# Patient Record
Sex: MALE | Race: WHITE | NOT HISPANIC OR LATINO
[De-identification: names, ages, dates, MRNs, and addresses within clinical notes are randomized per-mention and may not be internally consistent; named-entity substitution may affect disease eponyms.]

---

## 2017-01-11 ENCOUNTER — APPOINTMENT (OUTPATIENT)
Dept: UROLOGY | Facility: CLINIC | Age: 77
End: 2017-01-11

## 2017-01-11 VITALS — SYSTOLIC BLOOD PRESSURE: 140 MMHG | OXYGEN SATURATION: 95 % | DIASTOLIC BLOOD PRESSURE: 70 MMHG | HEART RATE: 75 BPM

## 2017-01-11 DIAGNOSIS — K59.09 OTHER CONSTIPATION: ICD-10-CM

## 2017-01-11 RX ORDER — DOCUSATE SODIUM 100 MG/1
100 CAPSULE ORAL TWICE DAILY
Qty: 180 | Refills: 2 | Status: ACTIVE | COMMUNITY
Start: 2017-01-11 | End: 1900-01-01

## 2017-01-12 LAB
APPEARANCE: CLEAR
BACTERIA: NEGATIVE
BILIRUBIN URINE: NEGATIVE
BLOOD URINE: NEGATIVE
COLOR: YELLOW
GLUCOSE QUALITATIVE U: NORMAL MG/DL
HYALINE CASTS: 1 /LPF
KETONES URINE: NEGATIVE
LEUKOCYTE ESTERASE URINE: NEGATIVE
MICROSCOPIC-UA: NORMAL
NITRITE URINE: NEGATIVE
PH URINE: 5.5
PROTEIN URINE: NEGATIVE MG/DL
RED BLOOD CELLS URINE: 1 /HPF
SPECIFIC GRAVITY URINE: 1.02
SQUAMOUS EPITHELIAL CELLS: 1 /HPF
UROBILINOGEN URINE: NORMAL MG/DL
WHITE BLOOD CELLS URINE: 1 /HPF

## 2017-01-16 LAB — BACTERIA UR CULT: NORMAL

## 2017-07-07 ENCOUNTER — APPOINTMENT (OUTPATIENT)
Dept: UROLOGY | Facility: CLINIC | Age: 77
End: 2017-07-07

## 2017-07-07 VITALS — OXYGEN SATURATION: 96 % | SYSTOLIC BLOOD PRESSURE: 120 MMHG | DIASTOLIC BLOOD PRESSURE: 80 MMHG | HEART RATE: 68 BPM

## 2018-01-05 ENCOUNTER — APPOINTMENT (OUTPATIENT)
Dept: UROLOGY | Facility: CLINIC | Age: 78
End: 2018-01-05

## 2018-09-13 ENCOUNTER — APPOINTMENT (OUTPATIENT)
Dept: UROLOGY | Facility: CLINIC | Age: 78
End: 2018-09-13
Payer: MEDICARE

## 2018-09-13 VITALS — HEART RATE: 66 BPM | SYSTOLIC BLOOD PRESSURE: 130 MMHG | OXYGEN SATURATION: 96 % | DIASTOLIC BLOOD PRESSURE: 80 MMHG

## 2018-09-13 DIAGNOSIS — N30.40 IRRADIATION CYSTITIS W/OUT HEMATURIA: ICD-10-CM

## 2018-09-13 PROCEDURE — 99213 OFFICE O/P EST LOW 20 MIN: CPT

## 2018-09-24 LAB
APPEARANCE: CLEAR
BACTERIA UR CULT: NORMAL
BACTERIA: NEGATIVE
BILIRUBIN URINE: NEGATIVE
BLOOD URINE: NEGATIVE
COLOR: YELLOW
GLUCOSE QUALITATIVE U: NEGATIVE MG/DL
HYALINE CASTS: 6 /LPF
KETONES URINE: NEGATIVE
LEUKOCYTE ESTERASE URINE: NEGATIVE
MICROSCOPIC-UA: NORMAL
NITRITE URINE: NEGATIVE
PH URINE: 5.5
PROTEIN URINE: NEGATIVE MG/DL
PSA SERPL-MCNC: 0.17 NG/ML
RED BLOOD CELLS URINE: 3 /HPF
SPECIFIC GRAVITY URINE: 1.02
SQUAMOUS EPITHELIAL CELLS: 2 /HPF
UROBILINOGEN URINE: NEGATIVE MG/DL
WHITE BLOOD CELLS URINE: 2 /HPF

## 2019-09-10 ENCOUNTER — TRANSCRIPTION ENCOUNTER (OUTPATIENT)
Age: 79
End: 2019-09-10

## 2019-09-12 ENCOUNTER — APPOINTMENT (OUTPATIENT)
Dept: UROLOGY | Facility: CLINIC | Age: 79
End: 2019-09-12
Payer: MEDICARE

## 2019-09-12 VITALS
BODY MASS INDEX: 41.78 KG/M2 | DIASTOLIC BLOOD PRESSURE: 80 MMHG | SYSTOLIC BLOOD PRESSURE: 128 MMHG | WEIGHT: 260 LBS | RESPIRATION RATE: 14 BRPM | HEIGHT: 66 IN | OXYGEN SATURATION: 96 % | HEART RATE: 71 BPM

## 2019-09-12 DIAGNOSIS — R33.9 RETENTION OF URINE, UNSPECIFIED: ICD-10-CM

## 2019-09-12 PROCEDURE — 99214 OFFICE O/P EST MOD 30 MIN: CPT

## 2019-09-12 NOTE — PHYSICAL EXAM
[General Appearance - Well Developed] : well developed [General Appearance - Well Nourished] : well nourished [General Appearance - In No Acute Distress] : no acute distress [Abdomen Soft] : soft [Abdomen Tenderness] : non-tender [Costovertebral Angle Tenderness] : no ~M costovertebral angle tenderness [Exaggerated Use Of Accessory Muscles For Inspiration] : no accessory muscle use [Normal Station and Gait] : the gait and station were normal for the patient's age [Oriented To Time, Place, And Person] : oriented to person, place, and time [FreeTextEntry1] : obese, PVR=0cc

## 2019-09-12 NOTE — ASSESSMENT
[FreeTextEntry1] : Prostate ca. ISABELLE\par --PSA today\par --RTC in 1y\par \par Urinary sx. Urgency and urge incontinence. \par --Trial of oxybutynin\par \par ED. Long standing and little function. DIscussed PDE5Is but with no real erectile activity, unlikely to be effective. Pt not interested in other tx

## 2019-09-12 NOTE — HISTORY OF PRESENT ILLNESS
[FreeTextEntry1] : 79yo gentleman with cc of prostate ca. Pt with hx of prostate ca s/p RRP in 1998 with biochemical recurrence s/p EBRT in 2011. PSAs have been good. Last was 0.17 in 9/2018. \par \par Pt with complaints of urinary frequency and urgency and occasional urge leakage. has some leakage most days. No pads unless he is traveling. Never tried any meds. Drinks water. Occasional coffee. Going every 1.5h during the day gets up 10-12 times at night but also has untreated CLAUDIA. \par \par No erections. Has never tried any meds or anything for this. Has HTN-controlled and obesity. Good exercise habits.

## 2019-09-16 LAB — PSA SERPL-MCNC: 0.19 NG/ML

## 2020-07-30 ENCOUNTER — APPOINTMENT (OUTPATIENT)
Dept: UROLOGY | Facility: CLINIC | Age: 80
End: 2020-07-30
Payer: MEDICARE

## 2020-07-30 VITALS
WEIGHT: 255 LBS | TEMPERATURE: 97.9 F | OXYGEN SATURATION: 96 % | DIASTOLIC BLOOD PRESSURE: 91 MMHG | BODY MASS INDEX: 40.98 KG/M2 | HEIGHT: 66 IN | SYSTOLIC BLOOD PRESSURE: 157 MMHG | HEART RATE: 70 BPM | RESPIRATION RATE: 14 BRPM

## 2020-07-30 DIAGNOSIS — N39.41 URGE INCONTINENCE: ICD-10-CM

## 2020-07-30 DIAGNOSIS — N52.9 MALE ERECTILE DYSFUNCTION, UNSPECIFIED: ICD-10-CM

## 2020-07-30 DIAGNOSIS — C61 MALIGNANT NEOPLASM OF PROSTATE: ICD-10-CM

## 2020-07-30 PROCEDURE — 99213 OFFICE O/P EST LOW 20 MIN: CPT

## 2020-07-30 NOTE — HISTORY OF PRESENT ILLNESS
[FreeTextEntry1] : 78yo gentleman with cc of prostate ca. Pt with hx of prostate ca s/p RRP in 1998 with biochemical recurrence s/p EBRT in 2011. PSAs have been good. Last was 0.19 in 9/2019. No new issues from  perspective. He was dx with DM since last year and is on metformin. \par \par Pt with complaints of urinary frequency and urgency and occasional urge leakage. has some leakage most days. No pads unless he is traveling. Never tried any meds. Drinks water. Occasional coffee. Going every 1.5h during the day gets up 10-12 times at night but also has untreated CLAUDIA. At visit last year he was given oxybutynin. He reports that this has helped. he is not consistently taking but ntoes that it is down to 4x per night instead of 10x. \par \par No erections. Has never tried any meds or anything for this. Has HTN-controlled and obesity. Good exercise habits.

## 2020-07-30 NOTE — ASSESSMENT
[FreeTextEntry1] : Prostate ca. ISABELLE\par --PSA today\par --RTC in 1y\par \par Urinary sx. Urgency and urge incontinence. \par --COnt oxybutynin\par \par ED. Long standing and little function. DIscussed PDE5Is but with no real erectile activity, unlikely to be effective. Pt not interested in other tx

## 2020-07-30 NOTE — PHYSICAL EXAM
[General Appearance - Well Nourished] : well nourished [General Appearance - Well Developed] : well developed [Abdomen Soft] : soft [General Appearance - In No Acute Distress] : no acute distress [Abdomen Tenderness] : non-tender [Costovertebral Angle Tenderness] : no ~M costovertebral angle tenderness [Oriented To Time, Place, And Person] : oriented to person, place, and time [Normal Station and Gait] : the gait and station were normal for the patient's age [Exaggerated Use Of Accessory Muscles For Inspiration] : no accessory muscle use [FreeTextEntry1] : obese

## 2020-08-04 LAB — PSA SERPL-MCNC: 0.24 NG/ML

## 2020-10-13 ENCOUNTER — NON-APPOINTMENT (OUTPATIENT)
Age: 80
End: 2020-10-13

## 2020-10-13 ENCOUNTER — APPOINTMENT (OUTPATIENT)
Dept: CARDIOLOGY | Facility: CLINIC | Age: 80
End: 2020-10-13
Payer: MEDICARE

## 2020-10-13 VITALS
OXYGEN SATURATION: 98 % | WEIGHT: 256 LBS | SYSTOLIC BLOOD PRESSURE: 147 MMHG | DIASTOLIC BLOOD PRESSURE: 90 MMHG | HEART RATE: 72 BPM | BODY MASS INDEX: 41.14 KG/M2 | HEIGHT: 66 IN

## 2020-10-13 VITALS — SYSTOLIC BLOOD PRESSURE: 134 MMHG | DIASTOLIC BLOOD PRESSURE: 84 MMHG

## 2020-10-13 PROCEDURE — 99204 OFFICE O/P NEW MOD 45 MIN: CPT

## 2020-10-13 PROCEDURE — 93000 ELECTROCARDIOGRAM COMPLETE: CPT

## 2020-10-13 RX ORDER — METFORMIN HYDROCHLORIDE 500 MG/1
500 TABLET, COATED ORAL DAILY
Qty: 30 | Refills: 3 | Status: ACTIVE | COMMUNITY

## 2020-10-13 RX ORDER — LABETALOL HYDROCHLORIDE 200 MG/1
200 TABLET, FILM COATED ORAL
Qty: 30 | Refills: 0 | Status: DISCONTINUED | COMMUNITY
Start: 2020-08-31 | End: 2020-10-13

## 2020-10-13 RX ORDER — ASPIRIN ENTERIC COATED TABLETS 81 MG 81 MG/1
81 TABLET, DELAYED RELEASE ORAL
Refills: 0 | Status: ACTIVE | COMMUNITY

## 2020-10-13 RX ORDER — OXYBUTYNIN CHLORIDE 5 MG/1
5 TABLET, EXTENDED RELEASE ORAL DAILY
Qty: 30 | Refills: 11 | Status: DISCONTINUED | COMMUNITY
Start: 2019-09-12 | End: 2020-10-13

## 2020-10-13 RX ORDER — AMLODIPINE BESYLATE 5 MG/1
5 TABLET ORAL
Qty: 90 | Refills: 0 | Status: DISCONTINUED | COMMUNITY
Start: 2017-05-15 | End: 2020-10-13

## 2020-10-13 NOTE — REVIEW OF SYSTEMS
[Recent Weight Gain (___ Lbs)] : recent [unfilled] ~Ulb weight gain [Shortness Of Breath] : no shortness of breath [Dyspnea on exertion] : dyspnea during exertion [Chest Pain] : no chest pain [Lower Ext Edema] : no extremity edema [Palpitations] : no palpitations [Negative] : Heme/Lymph

## 2020-10-13 NOTE — DISCUSSION/SUMMARY
[___ Month(s)] : [unfilled] month(s) [FreeTextEntry1] : The patient is an 79-year-old gentleman prostate cancer, diabetes mellitus, hypertension, hyperlipidemia with side effects to labetalol. \par #1 CV- obtain ECHO and stress test from [facility]\par #2 Htn- on ramipril bid, add amlodipine 5mg and stop labetalol\par #3 Lipids- on simvastatin, suboptimal last blood draw\par #4 DM- on metformin\par #5 General- We discussed adherence to a Mediterranean diet, weight loss and at least 30 minutes of daily exercise.\par

## 2020-10-13 NOTE — HISTORY OF PRESENT ILLNESS
[FreeTextEntry1] :  is an 79-year-old gentleman hypertension, hyperlipidemia, DM, ? CAD who presents for BP control. He feels he has gained weight and increase sugar with labetalol. Denies any chest pain, palpitations, lightheadedness or dizziness. Does get short of breath on exertion.

## 2020-10-14 LAB
25(OH)D3 SERPL-MCNC: 51.2 NG/ML
ALBUMIN SERPL ELPH-MCNC: 4.9 G/DL
ALP BLD-CCNC: 93 U/L
ALT SERPL-CCNC: 40 U/L
ANION GAP SERPL CALC-SCNC: 12 MMOL/L
AST SERPL-CCNC: 25 U/L
BASOPHILS # BLD AUTO: 0.03 K/UL
BASOPHILS NFR BLD AUTO: 0.3 %
BILIRUB SERPL-MCNC: 0.9 MG/DL
BUN SERPL-MCNC: 19 MG/DL
CALCIUM SERPL-MCNC: 10.2 MG/DL
CHLORIDE SERPL-SCNC: 103 MMOL/L
CHOLEST SERPL-MCNC: 175 MG/DL
CHOLEST/HDLC SERPL: 4.4 RATIO
CO2 SERPL-SCNC: 27 MMOL/L
CREAT SERPL-MCNC: 1.35 MG/DL
EOSINOPHIL # BLD AUTO: 0.14 K/UL
EOSINOPHIL NFR BLD AUTO: 1.4 %
ESTIMATED AVERAGE GLUCOSE: 126 MG/DL
GLUCOSE SERPL-MCNC: 132 MG/DL
HBA1C MFR BLD HPLC: 6 %
HCT VFR BLD CALC: 40.8 %
HDLC SERPL-MCNC: 40 MG/DL
HGB BLD-MCNC: 13.4 G/DL
IMM GRANULOCYTES NFR BLD AUTO: 0.4 %
LDLC SERPL CALC-MCNC: 107 MG/DL
LYMPHOCYTES # BLD AUTO: 2.29 K/UL
LYMPHOCYTES NFR BLD AUTO: 23 %
MAN DIFF?: NORMAL
MCHC RBC-ENTMCNC: 30.9 PG
MCHC RBC-ENTMCNC: 32.8 GM/DL
MCV RBC AUTO: 94 FL
MONOCYTES # BLD AUTO: 1.07 K/UL
MONOCYTES NFR BLD AUTO: 10.8 %
NEUTROPHILS # BLD AUTO: 6.37 K/UL
NEUTROPHILS NFR BLD AUTO: 64.1 %
PLATELET # BLD AUTO: 302 K/UL
POTASSIUM SERPL-SCNC: 4.6 MMOL/L
PROT SERPL-MCNC: 7.4 G/DL
RBC # BLD: 4.34 M/UL
RBC # FLD: 12.4 %
SARS-COV-2 IGG SERPL IA-ACNC: 0.39 INDEX
SARS-COV-2 IGG SERPL QL IA: NEGATIVE
SODIUM SERPL-SCNC: 141 MMOL/L
TRIGL SERPL-MCNC: 143 MG/DL
TSH SERPL-ACNC: 3.12 UIU/ML
VIT B12 SERPL-MCNC: 638 PG/ML
WBC # FLD AUTO: 9.94 K/UL

## 2020-10-14 RX ORDER — SIMVASTATIN 40 MG/1
40 TABLET, FILM COATED ORAL
Qty: 30 | Refills: 1 | Status: DISCONTINUED | COMMUNITY
End: 2020-10-14

## 2021-01-08 ENCOUNTER — RX RENEWAL (OUTPATIENT)
Age: 81
End: 2021-01-08

## 2021-01-12 ENCOUNTER — APPOINTMENT (OUTPATIENT)
Dept: CARDIOLOGY | Facility: CLINIC | Age: 81
End: 2021-01-12
Payer: MEDICARE

## 2021-01-12 ENCOUNTER — NON-APPOINTMENT (OUTPATIENT)
Age: 81
End: 2021-01-12

## 2021-01-12 VITALS
WEIGHT: 257 LBS | DIASTOLIC BLOOD PRESSURE: 78 MMHG | SYSTOLIC BLOOD PRESSURE: 124 MMHG | RESPIRATION RATE: 14 BRPM | BODY MASS INDEX: 41.3 KG/M2 | HEART RATE: 74 BPM | OXYGEN SATURATION: 98 % | HEIGHT: 66 IN

## 2021-01-12 PROCEDURE — 93000 ELECTROCARDIOGRAM COMPLETE: CPT

## 2021-01-12 PROCEDURE — 99214 OFFICE O/P EST MOD 30 MIN: CPT

## 2021-01-12 NOTE — PHYSICAL EXAM
[General Appearance - Well Developed] : well developed [Normal Appearance] : normal appearance [Well Groomed] : well groomed [General Appearance - Well Nourished] : well nourished [No Deformities] : no deformities [General Appearance - In No Acute Distress] : no acute distress [Normal Conjunctiva] : the conjunctiva exhibited no abnormalities [Eyelids - No Xanthelasma] : the eyelids demonstrated no xanthelasmas [Normal Oral Mucosa] : normal oral mucosa [No Oral Pallor] : no oral pallor [No Oral Cyanosis] : no oral cyanosis [Normal Jugular Venous A Waves Present] : normal jugular venous A waves present [Normal Jugular Venous V Waves Present] : normal jugular venous V waves present [No Jugular Venous Treadwell A Waves] : no jugular venous treadwell A waves [Respiration, Rhythm And Depth] : normal respiratory rhythm and effort [Exaggerated Use Of Accessory Muscles For Inspiration] : no accessory muscle use [Auscultation Breath Sounds / Voice Sounds] : lungs were clear to auscultation bilaterally [Heart Rate And Rhythm] : heart rate and rhythm were normal [Heart Sounds] : normal S1 and S2 [Murmurs] : no murmurs present [Abdomen Soft] : soft [Abdomen Tenderness] : non-tender [Abdomen Mass (___ Cm)] : no abdominal mass palpated [Abnormal Walk] : normal gait [Gait - Sufficient For Exercise Testing] : the gait was sufficient for exercise testing [Nail Clubbing] : no clubbing of the fingernails [Cyanosis, Localized] : no localized cyanosis [Petechial Hemorrhages (___cm)] : no petechial hemorrhages [Skin Color & Pigmentation] : normal skin color and pigmentation [] : no rash [No Venous Stasis] : no venous stasis [Skin Lesions] : no skin lesions [No Skin Ulcers] : no skin ulcer [No Xanthoma] : no  xanthoma was observed [Oriented To Time, Place, And Person] : oriented to person, place, and time [Affect] : the affect was normal [Mood] : the mood was normal [No Anxiety] : not feeling anxious

## 2021-01-12 NOTE — DISCUSSION/SUMMARY
[___ Month(s)] : [unfilled] month(s) [FreeTextEntry1] : The patient is an 80-year-old gentleman prostate cancer, diabetes mellitus, hypertension, hyperlipidemia who is doing well\par #1 CV- obtain ECHO and stress test from [facility]\par #2 Htn- on ramipril bid, amlodipine 5mg \par #3 Lipids- on atorvastatin, lipids today\par #4 DM- on metformin\par #5 General- We discussed adherence to a Mediterranean diet, weight loss and at least 30 minutes of daily exercise.\par

## 2021-01-12 NOTE — HISTORY OF PRESENT ILLNESS
[FreeTextEntry1] :  is an 79-year-old gentleman hypertension, hyperlipidemia, DM, CAD who is doing well. Denies any chest pain, palpitations, lightheadedness or dizziness. Does get short of breath on exertion.

## 2021-01-13 LAB
ALBUMIN SERPL ELPH-MCNC: 4.5 G/DL
ALP BLD-CCNC: 123 U/L
ALT SERPL-CCNC: 32 U/L
ANION GAP SERPL CALC-SCNC: 16 MMOL/L
AST SERPL-CCNC: 17 U/L
BILIRUB SERPL-MCNC: 1 MG/DL
BUN SERPL-MCNC: 18 MG/DL
CALCIUM SERPL-MCNC: 10.2 MG/DL
CHLORIDE SERPL-SCNC: 103 MMOL/L
CHOLEST SERPL-MCNC: 137 MG/DL
CO2 SERPL-SCNC: 22 MMOL/L
CREAT SERPL-MCNC: 1.56 MG/DL
ESTIMATED AVERAGE GLUCOSE: 143 MG/DL
GLUCOSE SERPL-MCNC: 271 MG/DL
HBA1C MFR BLD HPLC: 6.6 %
HDLC SERPL-MCNC: 40 MG/DL
LDLC SERPL CALC-MCNC: 70 MG/DL
NONHDLC SERPL-MCNC: 97 MG/DL
POTASSIUM SERPL-SCNC: 5 MMOL/L
PROT SERPL-MCNC: 7.3 G/DL
SODIUM SERPL-SCNC: 140 MMOL/L
TRIGL SERPL-MCNC: 135 MG/DL

## 2021-03-22 ENCOUNTER — RX RENEWAL (OUTPATIENT)
Age: 81
End: 2021-03-22

## 2021-05-27 ENCOUNTER — APPOINTMENT (OUTPATIENT)
Dept: CARDIOLOGY | Facility: CLINIC | Age: 81
End: 2021-05-27

## 2021-07-27 ENCOUNTER — APPOINTMENT (OUTPATIENT)
Dept: CARDIOLOGY | Facility: CLINIC | Age: 81
End: 2021-07-27
Payer: MEDICARE

## 2021-07-27 VITALS
BODY MASS INDEX: 41.14 KG/M2 | OXYGEN SATURATION: 95 % | SYSTOLIC BLOOD PRESSURE: 127 MMHG | HEIGHT: 66 IN | HEART RATE: 72 BPM | DIASTOLIC BLOOD PRESSURE: 82 MMHG | WEIGHT: 256 LBS

## 2021-07-27 VITALS — SYSTOLIC BLOOD PRESSURE: 126 MMHG | DIASTOLIC BLOOD PRESSURE: 80 MMHG

## 2021-07-27 PROCEDURE — 99213 OFFICE O/P EST LOW 20 MIN: CPT

## 2021-07-27 PROCEDURE — 93000 ELECTROCARDIOGRAM COMPLETE: CPT

## 2021-07-28 NOTE — DISCUSSION/SUMMARY
[___ Month(s)] : in [unfilled] month(s) [FreeTextEntry1] : The patient is an 80-year-old gentleman prostate cancer, diabetes mellitus, hypertension, hyperlipidemia who is doing well\par #1 CV- obtain ECHO and stress test from [facility]\par #2 Htn- continue ramipril bid, amlodipine 5mg \par #3 Lipids- continue atorvastatin, lipids therapeutic\par #4 DM- on metformin\par #5 General- We discussed adherence to a Mediterranean diet, weight loss and at least 30 minutes of daily exercise.\par

## 2021-07-28 NOTE — HISTORY OF PRESENT ILLNESS
[FreeTextEntry1] : Royal has been safe the last few months. No chest pain, palpitations or shortness of breath. His only complaint is frequent urination.

## 2021-07-29 ENCOUNTER — APPOINTMENT (OUTPATIENT)
Dept: UROLOGY | Facility: CLINIC | Age: 81
End: 2021-07-29

## 2021-09-16 ENCOUNTER — RX RENEWAL (OUTPATIENT)
Age: 81
End: 2021-09-16

## 2021-09-16 RX ORDER — RAMIPRIL 5 MG/1
5 CAPSULE ORAL
Qty: 180 | Refills: 3 | Status: ACTIVE | COMMUNITY
Start: 2020-08-06 | End: 1900-01-01

## 2021-10-11 ENCOUNTER — RX RENEWAL (OUTPATIENT)
Age: 81
End: 2021-10-11

## 2021-10-11 RX ORDER — ATORVASTATIN CALCIUM 40 MG/1
40 TABLET, FILM COATED ORAL
Qty: 90 | Refills: 3 | Status: ACTIVE | COMMUNITY
Start: 2020-10-14 | End: 1900-01-01

## 2021-10-19 ENCOUNTER — RX RENEWAL (OUTPATIENT)
Age: 81
End: 2021-10-19

## 2021-12-03 ENCOUNTER — RX RENEWAL (OUTPATIENT)
Age: 81
End: 2021-12-03

## 2021-12-03 RX ORDER — AMLODIPINE BESYLATE 5 MG/1
5 TABLET ORAL DAILY
Qty: 90 | Refills: 2 | Status: ACTIVE | COMMUNITY
Start: 2020-10-13 | End: 1900-01-01

## 2022-01-27 ENCOUNTER — NON-APPOINTMENT (OUTPATIENT)
Age: 82
End: 2022-01-27

## 2022-01-27 ENCOUNTER — APPOINTMENT (OUTPATIENT)
Dept: CARDIOLOGY | Facility: CLINIC | Age: 82
End: 2022-01-27
Payer: MEDICARE

## 2022-01-27 VITALS
WEIGHT: 252 LBS | BODY MASS INDEX: 40.5 KG/M2 | SYSTOLIC BLOOD PRESSURE: 144 MMHG | HEIGHT: 66 IN | OXYGEN SATURATION: 97 % | HEART RATE: 69 BPM | DIASTOLIC BLOOD PRESSURE: 93 MMHG

## 2022-01-27 DIAGNOSIS — E11.9 TYPE 2 DIABETES MELLITUS W/OUT COMPLICATIONS: ICD-10-CM

## 2022-01-27 DIAGNOSIS — E78.5 HYPERLIPIDEMIA, UNSPECIFIED: ICD-10-CM

## 2022-01-27 DIAGNOSIS — I10 ESSENTIAL (PRIMARY) HYPERTENSION: ICD-10-CM

## 2022-01-27 LAB
25(OH)D3 SERPL-MCNC: 38.1 NG/ML
ALBUMIN SERPL ELPH-MCNC: 4.6 G/DL
ALP BLD-CCNC: 122 U/L
ALT SERPL-CCNC: 22 U/L
ANION GAP SERPL CALC-SCNC: 14 MMOL/L
AST SERPL-CCNC: 16 U/L
BASOPHILS # BLD AUTO: 0.04 K/UL
BASOPHILS NFR BLD AUTO: 0.4 %
BILIRUB SERPL-MCNC: 1 MG/DL
BUN SERPL-MCNC: 15 MG/DL
CALCIUM SERPL-MCNC: 10.3 MG/DL
CHLORIDE SERPL-SCNC: 105 MMOL/L
CHOLEST SERPL-MCNC: 179 MG/DL
CO2 SERPL-SCNC: 24 MMOL/L
CREAT SERPL-MCNC: 1.36 MG/DL
EOSINOPHIL # BLD AUTO: 0.17 K/UL
EOSINOPHIL NFR BLD AUTO: 1.6 %
ESTIMATED AVERAGE GLUCOSE: 120 MG/DL
GLUCOSE SERPL-MCNC: 166 MG/DL
HBA1C MFR BLD HPLC: 5.8 %
HCT VFR BLD CALC: 41.8 %
HDLC SERPL-MCNC: 44 MG/DL
HGB BLD-MCNC: 13.7 G/DL
IMM GRANULOCYTES NFR BLD AUTO: 0.3 %
LDLC SERPL CALC-MCNC: 108 MG/DL
LYMPHOCYTES # BLD AUTO: 2.57 K/UL
LYMPHOCYTES NFR BLD AUTO: 23.6 %
MAN DIFF?: NORMAL
MCHC RBC-ENTMCNC: 30.2 PG
MCHC RBC-ENTMCNC: 32.8 GM/DL
MCV RBC AUTO: 92.3 FL
MONOCYTES # BLD AUTO: 0.91 K/UL
MONOCYTES NFR BLD AUTO: 8.3 %
NEUTROPHILS # BLD AUTO: 7.18 K/UL
NEUTROPHILS NFR BLD AUTO: 65.8 %
NONHDLC SERPL-MCNC: 134 MG/DL
PLATELET # BLD AUTO: 315 K/UL
POTASSIUM SERPL-SCNC: 4.7 MMOL/L
PROT SERPL-MCNC: 7.3 G/DL
RBC # BLD: 4.53 M/UL
RBC # FLD: 12.8 %
SODIUM SERPL-SCNC: 143 MMOL/L
TRIGL SERPL-MCNC: 130 MG/DL
WBC # FLD AUTO: 10.9 K/UL

## 2022-01-27 PROCEDURE — 93000 ELECTROCARDIOGRAM COMPLETE: CPT

## 2022-01-27 PROCEDURE — 99214 OFFICE O/P EST MOD 30 MIN: CPT

## 2022-01-29 PROBLEM — I10 HYPERTENSION: Status: ACTIVE | Noted: 2020-10-13

## 2022-01-29 NOTE — DISCUSSION/SUMMARY
[___ Month(s)] : in [unfilled] month(s) [FreeTextEntry1] : The patient is an 81-year-old gentleman prostate cancer, diabetes mellitus, hypertension, hyperlipidemia who is doing well\par #1 CV- obtain ECHO and stress test from [facility]\par #2 Htn- continue ramipril bid, amlodipine 5mg \par #3 Lipids- continue atorvastatin, lipids therapeutic\par #4 DM- on metformin\par #5 General- We discussed adherence to a Mediterranean diet and at least 30 minutes of daily exercise.\par

## 2022-07-28 ENCOUNTER — APPOINTMENT (OUTPATIENT)
Dept: CARDIOLOGY | Facility: CLINIC | Age: 82
End: 2022-07-28

## 2024-11-14 ENCOUNTER — INPATIENT (INPATIENT)
Facility: HOSPITAL | Age: 84
LOS: 3 days | Discharge: HOME | End: 2024-11-18
Attending: INTERNAL MEDICINE | Admitting: INTERNAL MEDICINE
Payer: SELF-PAY

## 2024-11-14 VITALS
DIASTOLIC BLOOD PRESSURE: 83 MMHG | HEIGHT: 66 IN | WEIGHT: 268.08 LBS | RESPIRATION RATE: 16 BRPM | TEMPERATURE: 97 F | HEART RATE: 73 BPM | OXYGEN SATURATION: 97 % | SYSTOLIC BLOOD PRESSURE: 130 MMHG

## 2024-11-14 DIAGNOSIS — R53.81 OTHER MALAISE: ICD-10-CM

## 2024-11-14 DIAGNOSIS — I10 ESSENTIAL (PRIMARY) HYPERTENSION: ICD-10-CM

## 2024-11-14 DIAGNOSIS — G91.2 (IDIOPATHIC) NORMAL PRESSURE HYDROCEPHALUS: ICD-10-CM

## 2024-11-14 DIAGNOSIS — E78.5 HYPERLIPIDEMIA, UNSPECIFIED: ICD-10-CM

## 2024-11-14 DIAGNOSIS — I25.10 ATHEROSCLEROTIC HEART DISEASE OF NATIVE CORONARY ARTERY WITHOUT ANGINA PECTORIS: ICD-10-CM

## 2024-11-14 DIAGNOSIS — E11.9 TYPE 2 DIABETES MELLITUS WITHOUT COMPLICATIONS: ICD-10-CM

## 2024-11-14 DIAGNOSIS — Z90.79 ACQUIRED ABSENCE OF OTHER GENITAL ORGAN(S): Chronic | ICD-10-CM

## 2024-11-14 LAB
ALBUMIN SERPL ELPH-MCNC: 3.9 G/DL — SIGNIFICANT CHANGE UP (ref 3.3–5)
ALP SERPL-CCNC: 113 U/L — SIGNIFICANT CHANGE UP (ref 40–120)
ALT FLD-CCNC: 16 U/L — SIGNIFICANT CHANGE UP (ref 12–78)
ANION GAP SERPL CALC-SCNC: 6 MMOL/L — SIGNIFICANT CHANGE UP (ref 5–17)
APPEARANCE UR: CLEAR — SIGNIFICANT CHANGE UP
AST SERPL-CCNC: 11 U/L — LOW (ref 15–37)
BACTERIA # UR AUTO: ABNORMAL /HPF
BASOPHILS # BLD AUTO: 0.03 K/UL — SIGNIFICANT CHANGE UP (ref 0–0.2)
BASOPHILS NFR BLD AUTO: 0.3 % — SIGNIFICANT CHANGE UP (ref 0–2)
BILIRUB SERPL-MCNC: 1.2 MG/DL — SIGNIFICANT CHANGE UP (ref 0.2–1.2)
BILIRUB UR-MCNC: NEGATIVE — SIGNIFICANT CHANGE UP
BUN SERPL-MCNC: 8 MG/DL — SIGNIFICANT CHANGE UP (ref 7–23)
CALCIUM SERPL-MCNC: 9.6 MG/DL — SIGNIFICANT CHANGE UP (ref 8.5–10.1)
CHLORIDE SERPL-SCNC: 109 MMOL/L — HIGH (ref 96–108)
CO2 SERPL-SCNC: 26 MMOL/L — SIGNIFICANT CHANGE UP (ref 22–31)
COLOR SPEC: YELLOW — SIGNIFICANT CHANGE UP
CREAT SERPL-MCNC: 1.16 MG/DL — SIGNIFICANT CHANGE UP (ref 0.5–1.3)
DIFF PNL FLD: NEGATIVE — SIGNIFICANT CHANGE UP
EGFR: 62 ML/MIN/1.73M2 — SIGNIFICANT CHANGE UP
EOSINOPHIL # BLD AUTO: 0.05 K/UL — SIGNIFICANT CHANGE UP (ref 0–0.5)
EOSINOPHIL NFR BLD AUTO: 0.4 % — SIGNIFICANT CHANGE UP (ref 0–6)
EPI CELLS # UR: PRESENT
GLUCOSE BLDC GLUCOMTR-MCNC: 136 MG/DL — HIGH (ref 70–99)
GLUCOSE SERPL-MCNC: 103 MG/DL — HIGH (ref 70–99)
GLUCOSE UR QL: NEGATIVE MG/DL — SIGNIFICANT CHANGE UP
HCT VFR BLD CALC: 46.1 % — SIGNIFICANT CHANGE UP (ref 39–50)
HGB BLD-MCNC: 15.8 G/DL — SIGNIFICANT CHANGE UP (ref 13–17)
IMM GRANULOCYTES NFR BLD AUTO: 0.4 % — SIGNIFICANT CHANGE UP (ref 0–0.9)
KETONES UR-MCNC: NEGATIVE MG/DL — SIGNIFICANT CHANGE UP
LEUKOCYTE ESTERASE UR-ACNC: NEGATIVE — SIGNIFICANT CHANGE UP
LYMPHOCYTES # BLD AUTO: 18.3 % — SIGNIFICANT CHANGE UP (ref 13–44)
LYMPHOCYTES # BLD AUTO: 2.08 K/UL — SIGNIFICANT CHANGE UP (ref 1–3.3)
MCHC RBC-ENTMCNC: 30.9 PG — SIGNIFICANT CHANGE UP (ref 27–34)
MCHC RBC-ENTMCNC: 34.3 G/DL — SIGNIFICANT CHANGE UP (ref 32–36)
MCV RBC AUTO: 90 FL — SIGNIFICANT CHANGE UP (ref 80–100)
MONOCYTES # BLD AUTO: 0.86 K/UL — SIGNIFICANT CHANGE UP (ref 0–0.9)
MONOCYTES NFR BLD AUTO: 7.6 % — SIGNIFICANT CHANGE UP (ref 2–14)
NEUTROPHILS # BLD AUTO: 8.3 K/UL — HIGH (ref 1.8–7.4)
NEUTROPHILS NFR BLD AUTO: 73 % — SIGNIFICANT CHANGE UP (ref 43–77)
NITRITE UR-MCNC: NEGATIVE — SIGNIFICANT CHANGE UP
NRBC # BLD: 0 /100 WBCS — SIGNIFICANT CHANGE UP (ref 0–0)
PH UR: 5.5 — SIGNIFICANT CHANGE UP (ref 5–8)
PLATELET # BLD AUTO: 278 K/UL — SIGNIFICANT CHANGE UP (ref 150–400)
POTASSIUM SERPL-MCNC: 3.7 MMOL/L — SIGNIFICANT CHANGE UP (ref 3.5–5.3)
POTASSIUM SERPL-SCNC: 3.7 MMOL/L — SIGNIFICANT CHANGE UP (ref 3.5–5.3)
PROT SERPL-MCNC: 7.7 GM/DL — SIGNIFICANT CHANGE UP (ref 6–8.3)
PROT UR-MCNC: 30 MG/DL
RBC # BLD: 5.12 M/UL — SIGNIFICANT CHANGE UP (ref 4.2–5.8)
RBC # FLD: 12.6 % — SIGNIFICANT CHANGE UP (ref 10.3–14.5)
RBC CASTS # UR COMP ASSIST: 3 /HPF — SIGNIFICANT CHANGE UP (ref 0–4)
SODIUM SERPL-SCNC: 141 MMOL/L — SIGNIFICANT CHANGE UP (ref 135–145)
SP GR SPEC: 1.02 — SIGNIFICANT CHANGE UP (ref 1–1.03)
UROBILINOGEN FLD QL: 1 MG/DL — SIGNIFICANT CHANGE UP (ref 0.2–1)
WBC # BLD: 11.36 K/UL — HIGH (ref 3.8–10.5)
WBC # FLD AUTO: 11.36 K/UL — HIGH (ref 3.8–10.5)
WBC UR QL: 3 /HPF — SIGNIFICANT CHANGE UP (ref 0–5)

## 2024-11-14 PROCEDURE — 93010 ELECTROCARDIOGRAM REPORT: CPT

## 2024-11-14 PROCEDURE — 70450 CT HEAD/BRAIN W/O DYE: CPT | Mod: 26,MC

## 2024-11-14 PROCEDURE — 99285 EMERGENCY DEPT VISIT HI MDM: CPT

## 2024-11-14 PROCEDURE — 71045 X-RAY EXAM CHEST 1 VIEW: CPT | Mod: 26

## 2024-11-14 PROCEDURE — 99222 1ST HOSP IP/OBS MODERATE 55: CPT

## 2024-11-14 PROCEDURE — 36410 VNPNXR 3YR/> PHY/QHP DX/THER: CPT

## 2024-11-14 RX ORDER — 0.9 % SODIUM CHLORIDE 0.9 %
1000 INTRAVENOUS SOLUTION INTRAVENOUS
Refills: 0 | Status: DISCONTINUED | OUTPATIENT
Start: 2024-11-14 | End: 2024-11-18

## 2024-11-14 RX ORDER — SITAGLIPTIN 50 MG/1
1 TABLET ORAL
Refills: 0 | DISCHARGE

## 2024-11-14 RX ORDER — AMLODIPINE BESYLATE 10 MG/1
5 TABLET ORAL DAILY
Refills: 0 | Status: DISCONTINUED | OUTPATIENT
Start: 2024-11-14 | End: 2024-11-18

## 2024-11-14 RX ORDER — CHOLECALCIFEROL (VITAMIN D3) 10MCG/0.25
1000 DROPS ORAL DAILY
Refills: 0 | Status: DISCONTINUED | OUTPATIENT
Start: 2024-11-14 | End: 2024-11-18

## 2024-11-14 RX ORDER — ACETAMINOPHEN, DIPHENHYDRAMINE HCL, PHENYLEPHRINE HCL 325; 25; 5 MG/1; MG/1; MG/1
3 TABLET ORAL AT BEDTIME
Refills: 0 | Status: DISCONTINUED | OUTPATIENT
Start: 2024-11-14 | End: 2024-11-15

## 2024-11-14 RX ORDER — ONDANSETRON HYDROCHLORIDE 4 MG/1
4 TABLET, FILM COATED ORAL EVERY 8 HOURS
Refills: 0 | Status: DISCONTINUED | OUTPATIENT
Start: 2024-11-14 | End: 2024-11-15

## 2024-11-14 RX ORDER — GLUCAGON INJECTION, SOLUTION 0.5 MG/.1ML
1 INJECTION, SOLUTION SUBCUTANEOUS ONCE
Refills: 0 | Status: DISCONTINUED | OUTPATIENT
Start: 2024-11-14 | End: 2024-11-18

## 2024-11-14 RX ORDER — ACETAMINOPHEN 500MG 500 MG/1
650 TABLET, COATED ORAL EVERY 6 HOURS
Refills: 0 | Status: DISCONTINUED | OUTPATIENT
Start: 2024-11-14 | End: 2024-11-18

## 2024-11-14 RX ORDER — AMLODIPINE BESYLATE 10 MG/1
1 TABLET ORAL
Refills: 0 | DISCHARGE

## 2024-11-14 RX ORDER — CHOLECALCIFEROL (VITAMIN D3) 10MCG/0.25
1 DROPS ORAL
Refills: 0 | DISCHARGE

## 2024-11-14 RX ORDER — ENOXAPARIN SODIUM 30 MG/.3ML
40 INJECTION SUBCUTANEOUS EVERY 12 HOURS
Refills: 0 | Status: DISCONTINUED | OUTPATIENT
Start: 2024-11-14 | End: 2024-11-18

## 2024-11-14 RX ORDER — MAGNESIUM, ALUMINUM HYDROXIDE 200-225/5
30 SUSPENSION, ORAL (FINAL DOSE FORM) ORAL EVERY 4 HOURS
Refills: 0 | Status: DISCONTINUED | OUTPATIENT
Start: 2024-11-14 | End: 2024-11-15

## 2024-11-14 RX ADMIN — Medication 80 MILLIGRAM(S): at 22:38

## 2024-11-14 RX ADMIN — ENOXAPARIN SODIUM 40 MILLIGRAM(S): 30 INJECTION SUBCUTANEOUS at 20:28

## 2024-11-14 NOTE — ED ADULT TRIAGE NOTE - CHIEF COMPLAINT QUOTE
went to urgent care be cause he was shaking and trembling,  Denies fever .  sent from urgent care by ems for unsteady gait and tremors . hx htn, dm.   patient observed with urinary incontinence and urine smell on his clothe.

## 2024-11-14 NOTE — ED ADULT NURSE NOTE - ED STAT RN HANDOFF DETAILS
10 Report endorsed to oncoming RN Jennifre . Safety checks compld this shift/Safety rounds completed hourly.

## 2024-11-14 NOTE — H&P ADULT - HISTORY OF PRESENT ILLNESS
Royal Jacob is an 83 year old male with PMHx of HTN, HLD, NIDDM2, CAD, hx of prostate CA (s/p prostatectomy in 1998 and XRT), NPH (refused intervention in the past), and ?memory loss who presented to the ED on 11/14/24 for complaints of tremors.    History is very limited as patient is an unreliable historian and has tangential speech. Patient reports he started having bilateral upper extremity shaking last night. Worried he will fall since he is shaking. Denies fevers or chills. Baseline functional status is ambulates unassisted indoors and with walker outdoors. Independent with all ADLs. Lives at home with wife and lifelong friend. 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 In addition, patient is adamant that his physician at [facility] instructed him to not take any medications though recent admission med rec in August 2024 with medications listed.    Chart review revealed prior admission to [facility] in August 2024. As per documentation, wife brought him to the hospital due to his refusal to take medications but patient otherwise without acute complaints. Therefore, admitted for social reasons. Discharged home.    In the ED, VSS. Labs grossly unremarkable except WBC 11.36K. CT head without acute intracranial hemorrhage, mass effect, or midline shift but with ventriculomegaly findings may be due to central volume loss however noncommunicating hydrocephalus cannot be excluded. CXR unremarkable. Did not receive any medications. Of note, ER physician states patient with new diagnosis of NPH. However, chart review indicates otherwise.

## 2024-11-14 NOTE — ED PROVIDER NOTE - NEUROLOGICAL, MLM
Alert and oriented, no focal deficits, no motor or sensory deficits +fine tremors noted in his upper extremities

## 2024-11-14 NOTE — ED PROVIDER NOTE - CLINICAL SUMMARY MEDICAL DECISION MAKING FREE TEXT BOX
83-year-old male with history of hypertension, diabetes, hyperlipidemia and prostate cancer presents today, sent from the urgent care center for evaluation for tremors and possible UTI.  Patient states that his tremors did start last night, denies fevers or chills, flulike symptoms, weakness, chest pain, shortness of breath, nausea/vomiting, dizziness or lightheadedness.  Patient denies alcohol use.  On exam patient is awake alert and oriented x 3,, fine tremors noted in upper extremities right more so than left.  Lung sounds are clear, heart sounds regular rate and rhythm, patient's abdomen is soft nontender/nondistended without guarding or rebound, lower extremities are nonedematous, positive pedal pulses.  Differential diagnosis includes but not limited to UTI,?  Parkinson's, hydrocephalus, alcohol use (less likely, patient denies alcohol use). Labs ordered, ekg, ct 83-year-old male with history of hypertension, diabetes, hyperlipidemia and prostate cancer presents today, sent from the urgent care center for evaluation for tremors and possible UTI.  Patient states that his tremors did start last night, denies fevers or chills, flulike symptoms, weakness, chest pain, shortness of breath, nausea/vomiting, dizziness or lightheadedness.  Patient denies alcohol use.  On exam patient is awake alert and oriented x 3,, fine tremors noted in upper extremities right more so than left.  Lung sounds are clear, heart sounds regular rate and rhythm, patient's abdomen is soft nontender/nondistended without guarding or rebound, lower extremities are nonedematous, positive pedal pulses.  Differential diagnosis includes but not limited to UTI,?  Parkinson's, hydrocephalus, alcohol use (less likely, patient denies alcohol use). Labs ordered, ekg, ct    Labs reviewed, within normal  ct shows ventriculomegaly, cant completely exclude noncommunicating hydrocephalus  pt admitted, neuro to be consulted

## 2024-11-14 NOTE — H&P ADULT - NSICDXPASTMEDICALHX_GEN_ALL_CORE_FT
PAST MEDICAL HISTORY:  Diabetes mellitus type II, non insulin dependent     History of prostate cancer     HLD (hyperlipidemia)     Hypertension     NPH (normal pressure hydrocephalus)

## 2024-11-14 NOTE — ED PROCEDURE NOTE - US POC STATEMENT
The patient/family was/were informed of limited nature of the exam. Representative images were printed to be scanned into the chart or directly uploaded into the medical record.
no

## 2024-11-14 NOTE — H&P ADULT - ASSESSMENT
Royal Jacob is an 83 year old male with PMHx of HTN, HLD, NIDDM2, CAD, hx of prostate CA (s/p s/p prostatectomy in 1998 and XRT), NPH (refused intervention in the past), and ?memory loss who presented to the ED on 11/14/24 for complaints of tremors and admitted for physical deconditioning.    Tremors, unclear etiology  Reports onset of b/l UE shaking last night, no fevers or chills  CXR unremarkable  F/u U/A to r/o infectious etiology; though, clinically does not appear to have an infection  Monitor    Physical deconditioning  Worried he will fall since he is shaking  Baseline functional status is ambulates unassisted indoors and with walker outdoors  Fall precautions  PT consulted      Chronic medical conditions:  HTN: PTA amlodipine 5 mg  HLD: PTA atorvastatin 80 mg  NIDDM2: last known A1c 6.8 (on 1/27/22), POC qac and qhs, low dose SSI qac started, PTA sitagliptin 100 mg held, blood glucose goal < 180, f/u A1c  CAD: not on any PTA meds  NPH: CT head with evidence of ventriculomegaly, not new diagnosis, previously refused intervention as per documentation from outside facility    Medication reconciliation completed using admission med rec from [facility] on 8/24/24 as unable to view discharge summary and patient is an unreliable historian and states he does not take any medications. Please call his pharmacy for med list in AM.

## 2024-11-14 NOTE — H&P ADULT - NSHPLABSRESULTS_GEN_ALL_CORE
15.8   11.36 )-----------( 278      ( 14 Nov 2024 16:20 )             46.1     141  |  109[H]  |  8   ----------------------------<  103[H]     11-14  3.7   |  26  |  1.16    Ca    9.6      14 Nov 2024 16:20    TPro  7.7  /  Alb  3.9  /  TBili  1.2  /  DBili  x   /  AST  11[L]  /  ALT  16  /  AlkPhos  113  11-14    CT head without contrast 11/14/24  IMPRESSION:  No acute intracranial hemorrhage, mass effect, or midline shift.    Ventriculomegaly findings may be due to central volume loss however noncommunicating hydrocephalus cannot be excluded. Correlate clinically for normal pressure hydrocephalus.    Chest x-ray 11/14/24  IMPRESSION:  No acute finding.

## 2024-11-14 NOTE — ED ADULT NURSE REASSESSMENT NOTE - NS ED NURSE REASSESS COMMENT FT1
US guided line needed patient pending blood work 1x attempt patient states difficult stick MD Colon at bedside

## 2024-11-14 NOTE — ED ADULT NURSE NOTE - OBJECTIVE STATEMENT
A&OX3  PATIENT c/o LLQ intermitted abdominal pain no  N/V/ Constipation or diarrhea/ fevers/ chills patient states having tremors started last night sent from urgent care PMH Prostate Ca denies HTN/HLD/ DM2 DENIES BLOOD IN STOOL OR URINE

## 2024-11-14 NOTE — H&P ADULT - TIME BILLING
coordination of care with ER physician and ER RN, reviewing notes from recent hospitalization at [facility], obtaining history, performing a physical examination, reviewing and interpreting labs and imaging, ordering further studies and tests, explaining the diagnosis and treatment plan to patient, completing medication reconciliation to the best of my ability, and documentation as above.

## 2024-11-14 NOTE — H&P ADULT - NSHPPHYSICALEXAM_GEN_ALL_CORE
T(C): 36.4 (11-14-24 @ 15:33), Max: 36.4 (11-14-24 @ 15:33)  HR: 75 (11-14-24 @ 15:33) (73 - 75)  BP: 147/84 (11-14-24 @ 15:33) (130/83 - 147/84)  RR: 17 (11-14-24 @ 15:33) (16 - 17)  SpO2: 100% (11-14-24 @ 15:33) (97% - 100%)    CONSTITUTIONAL: no apparent distress, obese  EYES: PERRLA and symmetric, EOMI  ENMT: Oral mucosa with moist membranes  RESP: No respiratory distress, no use of accessory muscles; CTA b/l  CV: RRR  GI: Soft, NT, ND  NEURO: alert and oriented to person and place, no tremors appreciated

## 2024-11-15 LAB
A1C WITH ESTIMATED AVERAGE GLUCOSE RESULT: 5.1 % — SIGNIFICANT CHANGE UP (ref 4–5.6)
ESTIMATED AVERAGE GLUCOSE: 100 MG/DL — SIGNIFICANT CHANGE UP (ref 68–114)
GLUCOSE BLDC GLUCOMTR-MCNC: 107 MG/DL — HIGH (ref 70–99)
GLUCOSE BLDC GLUCOMTR-MCNC: 116 MG/DL — HIGH (ref 70–99)
GLUCOSE BLDC GLUCOMTR-MCNC: 117 MG/DL — HIGH (ref 70–99)
GLUCOSE BLDC GLUCOMTR-MCNC: 134 MG/DL — HIGH (ref 70–99)
GLUCOSE BLDC GLUCOMTR-MCNC: 161 MG/DL — HIGH (ref 70–99)

## 2024-11-15 PROCEDURE — 99232 SBSQ HOSP IP/OBS MODERATE 35: CPT

## 2024-11-15 RX ADMIN — Medication 1000 MICROGRAM(S): at 12:22

## 2024-11-15 RX ADMIN — AMLODIPINE BESYLATE 5 MILLIGRAM(S): 10 TABLET ORAL at 05:53

## 2024-11-15 RX ADMIN — Medication 1000 UNIT(S): at 12:22

## 2024-11-15 RX ADMIN — ENOXAPARIN SODIUM 40 MILLIGRAM(S): 30 INJECTION SUBCUTANEOUS at 05:54

## 2024-11-15 RX ADMIN — Medication 1: at 12:22

## 2024-11-15 RX ADMIN — ENOXAPARIN SODIUM 40 MILLIGRAM(S): 30 INJECTION SUBCUTANEOUS at 17:04

## 2024-11-15 RX ADMIN — Medication 80 MILLIGRAM(S): at 21:27

## 2024-11-15 NOTE — CONSULT NOTE ADULT - SUBJECTIVE AND OBJECTIVE BOX
Neurology consult    ROYAL [id number]     Patient is a 83y old  Male who presents with a chief complaint of Physical deconditioning (2024 20:06)      HPI:  Royal Jacob is an 83 year old male with PMHx of HTN, HLD, NIDDM2, CAD, hx of prostate CA (s/p prostatectomy in  and XRT), NPH (refused intervention in the past), and ?memory loss who presented to the ED on 24 for complaints of tremors.    History is very limited as patient is an unreliable historian and has tangential speech. Patient reports he started having bilateral upper extremity shaking last night. Worried he will fall since he is shaking. Denies fevers or chills. Baseline functional status is ambulates unassisted indoors and with walker outdoors. Independent with all ADLs. Lives at home with wife and lifelong friend. 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 In addition, patient is adamant that his physician at [facility] instructed him to not take any medications though recent admission med rec in 2024 with medications listed.    Chart review revealed prior admission to [facility] in 2024. As per documentation, wife brought him to the hospital due to his refusal to take medications but patient otherwise without acute complaints. Therefore, admitted for social reasons. Discharged home.    In the ED, VSS. Labs grossly unremarkable except WBC 11.36K. CT head without acute intracranial hemorrhage, mass effect, or midline shift but with ventriculomegaly findings may be due to central volume loss however noncommunicating hydrocephalus cannot be excluded. CXR unremarkable. Did not receive any medications. Of note, ER physician states patient with new diagnosis of NPH. However, chart review indicates otherwise. (2024 20:06)        MEDICATIONS    acetaminophen     Tablet .. 650 milliGRAM(s) Oral every 6 hours PRN  aluminum hydroxide/magnesium hydroxide/simethicone Suspension 30 milliLiter(s) Oral every 4 hours PRN  amLODIPine   Tablet 5 milliGRAM(s) Oral daily  atorvastatin 80 milliGRAM(s) Oral at bedtime  cholecalciferol 1000 Unit(s) Oral daily  cyanocobalamin 1000 MICROGram(s) Oral daily  dextrose 5%. 1000 milliLiter(s) IV Continuous <Continuous>  dextrose 5%. 1000 milliLiter(s) IV Continuous <Continuous>  dextrose 50% Injectable 25 Gram(s) IV Push once  dextrose 50% Injectable 12.5 Gram(s) IV Push once  dextrose 50% Injectable 25 Gram(s) IV Push once  dextrose Oral Gel 15 Gram(s) Oral once PRN  enoxaparin Injectable 40 milliGRAM(s) SubCutaneous every 12 hours  glucagon  Injectable 1 milliGRAM(s) IntraMuscular once  insulin lispro (ADMELOG) corrective regimen sliding scale   SubCutaneous three times a day before meals  melatonin 3 milliGRAM(s) Oral at bedtime PRN  ondansetron Injectable 4 milliGRAM(s) IV Push every 8 hours PRN      PMH: Hypertension    Diabetes    HLD (hyperlipidemia)    Prostate cancer    Diabetes mellitus type II, non insulin dependent    History of prostate cancer    NPH (normal pressure hydrocephalus)         PSH: S/P prostatectomy        Family history: No history of dementia, strokes, or seizures   FAMILY HISTORY:      SOCIAL HISTORY:  No history of tobacco or alcohol use     Allergies    No Known Allergies    Intolerances            Vital Signs Last 24 Hrs  T(C): 36.8 (15 Nov 2024 10:46), Max: 36.9 (15 Nov 2024 05:19)  T(F): 98.3 (15 Nov 2024 10:46), Max: 98.4 (15 Nov 2024 05:19)  HR: 67 (15 Nov 2024 10:46) (67 - 75)  BP: 120/80 (15 Nov 2024 10:46) (109/61 - 147/84)  BP(mean): --  RR: 18 (15 Nov 2024 10:46) (16 - 18)  SpO2: 99% (15 Nov 2024 10:46) (97% - 100%)    Parameters below as of 15 Nov 2024 10:46  Patient On (Oxygen Delivery Method): room air          On Neurological Examination:    Neuro: AAOx2 wromng, month, obeys commands, no dysarthria; calculations intact, fluent names, repeats     CN: PERRL, EOMI, VFF normal gaze preference, no facial palsy,     Motor: no drift in all extremeties    Sensory: Intact to LT and PP no extinction    Coordination: FTN intact b/l             LABS:  CBC Full  -  ( 2024 16:20 )  WBC Count : 11.36 K/uL  RBC Count : 5.12 M/uL  Hemoglobin : 15.8 g/dL  Hematocrit : 46.1 %  Platelet Count - Automated : 278 K/uL  Mean Cell Volume : 90.0 fl  Mean Cell Hemoglobin : 30.9 pg  Mean Cell Hemoglobin Concentration : 34.3 g/dL  Auto Neutrophil # : 8.30 K/uL  Auto Lymphocyte # : 2.08 K/uL  Auto Monocyte # : 0.86 K/uL  Auto Eosinophil # : 0.05 K/uL  Auto Basophil # : 0.03 K/uL  Auto Neutrophil % : 73.0 %  Auto Lymphocyte % : 18.3 %  Auto Monocyte % : 7.6 %  Auto Eosinophil % : 0.4 %  Auto Basophil % : 0.3 %    Urinalysis Basic - ( 2024 20:07 )    Color: Yellow / Appearance: Clear / S.016 / pH: x  Gluc: x / Ketone: Negative mg/dL  / Bili: Negative / Urobili: 1.0 mg/dL   Blood: x / Protein: 30 mg/dL / Nitrite: Negative   Leuk Esterase: Negative / RBC: 3 /HPF / WBC 3 /HPF   Sq Epi: x / Non Sq Epi: x / Bacteria: Moderate /HPF      11-14    141  |  109[H]  |  8   ----------------------------<  103[H]  3.7   |  26  |  1.16    Ca    9.6      2024 16:20    TPro  7.7  /  Alb  3.9  /  TBili  1.2  /  DBili  x   /  AST  11[L]  /  ALT  16  /  AlkPhos  113  11-14    LIVER FUNCTIONS - ( 2024 16:20 )  Alb: 3.9 g/dL / Pro: 7.7 gm/dL / ALK PHOS: 113 U/L / ALT: 16 U/L / AST: 11 U/L / GGT: x           Hemoglobin A1C:             RADIOLOGY  < from: CT Head No Cont (24 @ 15:36) >    IMPRESSION:  No acute intracranial hemorrhage, mass effect, or midline shift.    Ventriculomegaly findings may be due to central volume loss however   noncommunicating hydrocephalus cannot be excluded.. Correlate clinically   for normal pressure hydrocephalus.        --- End of Report ---    < end of copied text >

## 2024-11-15 NOTE — CONSULT NOTE ADULT - ASSESSMENT
83 year old male with PMHx of HTN, HLD, NIDDM2, CAD, hx of prostate CA (s/p prostatectomy in 1998 and XRT), NPH (refused intervention in the past), and ?memory loss who presented to the ED on 11/14/24 for complaints of tremors/ tremors resolved. PE cognitive defiicts  CTH  ventriculomegaly, DESH to my eyes, refused intervention for NPH in th past    Impression: Dementia, NPH      -supportive care  -PT  -No further inpatient Neurologic workup at this time  -Can follow up with Neurology, Dr. Georgi Ellison at [phone] for further w/u  -B12, folate, TSH   -In order to enhance patient's overall well-being and clinical course, please try avoiding benzodiazepines, anticholinergics, and antihistamines (Can cause worsening confusion/delirium). Additionally, continue reorientation, supportive care, maintaining regular sleep/wake cycle, and optimizing nutritional/medical factors.

## 2024-11-15 NOTE — PROGRESS NOTE ADULT - SUBJECTIVE AND OBJECTIVE BOX
INTERVAL HPI/OVERNIGHT EVENTS:  Pt seen and examined at bedside.     Allergies/Intolerance: No Known Allergies      MEDICATIONS  (STANDING):  amLODIPine   Tablet 5 milliGRAM(s) Oral daily  atorvastatin 80 milliGRAM(s) Oral at bedtime  cholecalciferol 1000 Unit(s) Oral daily  cyanocobalamin 1000 MICROGram(s) Oral daily  dextrose 5%. 1000 milliLiter(s) (50 mL/Hr) IV Continuous <Continuous>  dextrose 5%. 1000 milliLiter(s) (100 mL/Hr) IV Continuous <Continuous>  dextrose 50% Injectable 25 Gram(s) IV Push once  dextrose 50% Injectable 12.5 Gram(s) IV Push once  dextrose 50% Injectable 25 Gram(s) IV Push once  enoxaparin Injectable 40 milliGRAM(s) SubCutaneous every 12 hours  glucagon  Injectable 1 milliGRAM(s) IntraMuscular once  insulin lispro (ADMELOG) corrective regimen sliding scale   SubCutaneous three times a day before meals    MEDICATIONS  (PRN):  acetaminophen     Tablet .. 650 milliGRAM(s) Oral every 6 hours PRN Temp greater or equal to 38C (100.4F), Mild Pain (1 - 3)  aluminum hydroxide/magnesium hydroxide/simethicone Suspension 30 milliLiter(s) Oral every 4 hours PRN Dyspepsia  dextrose Oral Gel 15 Gram(s) Oral once PRN Blood Glucose LESS THAN 70 milliGRAM(s)/deciliter  melatonin 3 milliGRAM(s) Oral at bedtime PRN Insomnia  ondansetron Injectable 4 milliGRAM(s) IV Push every 8 hours PRN Nausea and/or Vomiting        ROS: all systems reviewed and wnl      PHYSICAL EXAMINATION:  Vital Signs Last 24 Hrs  T(C): 36.8 (15 Nov 2024 10:46), Max: 36.9 (15 Nov 2024 05:19)  T(F): 98.3 (15 Nov 2024 10:46), Max: 98.4 (15 Nov 2024 05:19)  HR: 67 (15 Nov 2024 10:46) (67 - 75)  BP: 120/80 (15 Nov 2024 10:46) (109/61 - 147/84)  BP(mean): --  RR: 18 (15 Nov 2024 10:46) (16 - 18)  SpO2: 99% (15 Nov 2024 10:46) (97% - 100%)    Parameters below as of 15 Nov 2024 10:46  Patient On (Oxygen Delivery Method): room air      CAPILLARY BLOOD GLUCOSE      POCT Blood Glucose.: 161 mg/dL (15 Nov 2024 11:38)  POCT Blood Glucose.: 107 mg/dL (15 Nov 2024 09:17)  POCT Blood Glucose.: 116 mg/dL (15 Nov 2024 08:12)  POCT Blood Glucose.: 136 mg/dL (2024 20:27)      14 @ 07:01  -  11-15 @ 07:00  --------------------------------------------------------  IN: 400 mL / OUT: 0 mL / NET: 400 mL        GENERAL:   NECK: supple, No JVD  CHEST/LUNG: clear to auscultation bilaterally; no rales, rhonchi, or wheezing b/l  HEART: normal S1, S2  ABDOMEN: BS+, soft, ND, NT   EXTREMITIES:  pulses palpable; no clubbing, cyanosis, or edema b/l LEs    LABS:                        15.8   11.36 )-----------( 278      ( 2024 16:20 )             46.1     11-14    141  |  109[H]  |  8   ----------------------------<  103[H]  3.7   |  26  |  1.16    Ca    9.6      2024 16:20    TPro  7.7  /  Alb  3.9  /  TBili  1.2  /  DBili  x   /  AST  11[L]  /  ALT  16  /  AlkPhos  113  11-14      Urinalysis Basic - ( 2024 20:07 )    Color: Yellow / Appearance: Clear / S.016 / pH: x  Gluc: x / Ketone: Negative mg/dL  / Bili: Negative / Urobili: 1.0 mg/dL   Blood: x / Protein: 30 mg/dL / Nitrite: Negative   Leuk Esterase: Negative / RBC: 3 /HPF / WBC 3 /HPF   Sq Epi: x / Non Sq Epi: x / Bacteria: Moderate /HPF             INTERVAL HPI/OVERNIGHT EVENTS:  Pt seen and examined at bedside.     Allergies/Intolerance: No Known Allergies      MEDICATIONS  (STANDING):  amLODIPine   Tablet 5 milliGRAM(s) Oral daily  atorvastatin 80 milliGRAM(s) Oral at bedtime  cholecalciferol 1000 Unit(s) Oral daily  cyanocobalamin 1000 MICROGram(s) Oral daily  dextrose 5%. 1000 milliLiter(s) (50 mL/Hr) IV Continuous <Continuous>  dextrose 5%. 1000 milliLiter(s) (100 mL/Hr) IV Continuous <Continuous>  dextrose 50% Injectable 25 Gram(s) IV Push once  dextrose 50% Injectable 12.5 Gram(s) IV Push once  dextrose 50% Injectable 25 Gram(s) IV Push once  enoxaparin Injectable 40 milliGRAM(s) SubCutaneous every 12 hours  glucagon  Injectable 1 milliGRAM(s) IntraMuscular once  insulin lispro (ADMELOG) corrective regimen sliding scale   SubCutaneous three times a day before meals    MEDICATIONS  (PRN):  acetaminophen     Tablet .. 650 milliGRAM(s) Oral every 6 hours PRN Temp greater or equal to 38C (100.4F), Mild Pain (1 - 3)  aluminum hydroxide/magnesium hydroxide/simethicone Suspension 30 milliLiter(s) Oral every 4 hours PRN Dyspepsia  dextrose Oral Gel 15 Gram(s) Oral once PRN Blood Glucose LESS THAN 70 milliGRAM(s)/deciliter  melatonin 3 milliGRAM(s) Oral at bedtime PRN Insomnia  ondansetron Injectable 4 milliGRAM(s) IV Push every 8 hours PRN Nausea and/or Vomiting        ROS: all systems reviewed and wnl      PHYSICAL EXAMINATION:  Vital Signs Last 24 Hrs  T(C): 36.8 (15 Nov 2024 10:46), Max: 36.9 (15 Nov 2024 05:19)  T(F): 98.3 (15 Nov 2024 10:46), Max: 98.4 (15 Nov 2024 05:19)  HR: 67 (15 Nov 2024 10:46) (67 - 75)  BP: 120/80 (15 Nov 2024 10:46) (109/61 - 147/84)  BP(mean): --  RR: 18 (15 Nov 2024 10:46) (16 - 18)  SpO2: 99% (15 Nov 2024 10:46) (97% - 100%)    Parameters below as of 15 Nov 2024 10:46  Patient On (Oxygen Delivery Method): room air      CAPILLARY BLOOD GLUCOSE      POCT Blood Glucose.: 161 mg/dL (15 Nov 2024 11:38)  POCT Blood Glucose.: 107 mg/dL (15 Nov 2024 09:17)  POCT Blood Glucose.: 116 mg/dL (15 Nov 2024 08:12)  POCT Blood Glucose.: 136 mg/dL (2024 20:27)       @ 07:01  -  11-15 @ 07:00  --------------------------------------------------------  IN: 400 mL / OUT: 0 mL / NET: 400 mL        GENERAL: stable, comfortable on RA, no fevers or SOB, baseline dementia  NECK: supple, No JVD  CHEST/LUNG: clear to auscultation bilaterally; no rales, rhonchi, or wheezing b/l  HEART: normal S1, S2  ABDOMEN: BS+, soft, ND, NT   EXTREMITIES:  pulses palpable; no clubbing, cyanosis, or edema b/l LEs    LABS:                        15.8   11.36 )-----------( 278      ( 2024 16:20 )             46.1     14    141  |  109[H]  |  8   ----------------------------<  103[H]  3.7   |  26  |  1.16    Ca    9.6      2024 16:20    TPro  7.7  /  Alb  3.9  /  TBili  1.2  /  DBili  x   /  AST  11[L]  /  ALT  16  /  AlkPhos  113  11-14      Urinalysis Basic - ( 2024 20:07 )    Color: Yellow / Appearance: Clear / S.016 / pH: x  Gluc: x / Ketone: Negative mg/dL  / Bili: Negative / Urobili: 1.0 mg/dL   Blood: x / Protein: 30 mg/dL / Nitrite: Negative   Leuk Esterase: Negative / RBC: 3 /HPF / WBC 3 /HPF   Sq Epi: x / Non Sq Epi: x / Bacteria: Moderate /HPF

## 2024-11-15 NOTE — PROGRESS NOTE ADULT - ASSESSMENT
Royal Jacob is an 83 year old male with PMHx of HTN, HLD, NIDDM2, CAD, hx of prostate CA (s/p s/p prostatectomy in 1998 and XRT), NPH (refused intervention in the past), and ?memory loss who presented to the ED on 11/14/24 for complaints of tremors and admitted for physical deconditioning.    Tremors, unclear etiology  Reports onset of b/l UE shaking last night, no fevers or chills  CXR unremarkable  F/u U/A to r/o infectious etiology; though, clinically does not appear to have an infection  Monitor    Physical deconditioning  Worried he will fall since he is shaking  Baseline functional status is ambulates unassisted indoors and with walker outdoors  Fall precautions  PT consulted      Chronic medical conditions:  HTN: PTA amlodipine 5 mg  HLD: PTA atorvastatin 80 mg  NIDDM2: last known A1c 6.8 (on 1/27/22), POC qac and qhs, low dose SSI qac started, PTA sitagliptin 100 mg held, blood glucose goal < 180, f/u A1c  CAD: not on any PTA meds  NPH: CT head with evidence of ventriculomegaly, not new diagnosis, previously refused intervention as per documentation from outside facility    Medication reconciliation completed using admission med rec from [facility] on 8/24/24 as unable to view discharge summary and patient is an unreliable historian and states he does not take any medications. Please call his pharmacy for med list in AM.  Royal Jacob is an 83 year old male with PMHx of HTN, HLD, NIDDM2, CAD, hx of prostate CA (s/p s/p prostatectomy in 1998 and XRT), NPH (refused intervention in the past), and ?memory loss who presented to the ED on 11/14/24 for complaints of tremors and admitted for physical deconditioning.      Physical deconditioning : Worried he will fall since he is shaking  Baseline functional status is ambulates unassisted indoors and with walker outdoors  Fall precautions  PT consulted, pending eval.     Per neuro note will check B-12, folate and TSH in AM.       Chronic medical conditions:  HTN: PTA amlodipine 5 mg  HLD: PTA atorvastatin 80 mg  NIDDM2: last known A1c 6.8 (on 1/27/22), POC qac and qhs, low dose SSI qac started, PTA sitagliptin 100 mg held, blood glucose goal < 180, f/u A1c  CAD: not on any PTA meds  NPH: CT head with evidence of ventriculomegaly, not new diagnosis, previously refused intervention as per documentation from outside facility    Check PT eval and dispo per PT assesment.  Royal Jacob is an 83 year old male with PMHx of HTN, HLD, NIDDM2, CAD, hx of prostate CA (s/p s/p prostatectomy in 1998 and XRT), NPH (refused intervention in the past), and ?memory loss who presented to the ED on 11/14/24 for complaints of tremors and admitted for physical deconditioning.      Physical deconditioning : Worried he will fall since he is shaking  Baseline functional status is ambulates unassisted indoors and with walker outdoors  Fall precautions  PT consulted, pending eval.     Per neuro note will check B-12, folate and TSH in AM.       Chronic medical conditions:  HTN: PTA amlodipine 5 mg  HLD: PTA atorvastatin 80 mg  NIDDM2: last known A1c 6.8 (on 1/27/22), POC qac and qhs, low dose SSI qac started, PTA sitagliptin 100 mg held, blood glucose goal < 180, f/u A1c  CAD: not on any PTA meds  NPH: CT head with evidence of ventriculomegaly, not new diagnosis, previously refused intervention as per documentation from outside facility    Check PT eval and dispo per PT assesment.     Needs B-12, TSH and folate in AM.

## 2024-11-16 LAB
APPEARANCE UR: CLEAR — SIGNIFICANT CHANGE UP
BILIRUB UR-MCNC: NEGATIVE — SIGNIFICANT CHANGE UP
COLOR SPEC: YELLOW — SIGNIFICANT CHANGE UP
DIFF PNL FLD: NEGATIVE — SIGNIFICANT CHANGE UP
FOLATE SERPL-MCNC: 3.2 NG/ML — LOW
GLUCOSE BLDC GLUCOMTR-MCNC: 102 MG/DL — HIGH (ref 70–99)
GLUCOSE BLDC GLUCOMTR-MCNC: 109 MG/DL — HIGH (ref 70–99)
GLUCOSE BLDC GLUCOMTR-MCNC: 110 MG/DL — HIGH (ref 70–99)
GLUCOSE BLDC GLUCOMTR-MCNC: 137 MG/DL — HIGH (ref 70–99)
GLUCOSE UR QL: NEGATIVE MG/DL — SIGNIFICANT CHANGE UP
KETONES UR-MCNC: ABNORMAL MG/DL
LEUKOCYTE ESTERASE UR-ACNC: NEGATIVE — SIGNIFICANT CHANGE UP
NITRITE UR-MCNC: NEGATIVE — SIGNIFICANT CHANGE UP
PH UR: 5.5 — SIGNIFICANT CHANGE UP (ref 5–8)
PROT UR-MCNC: NEGATIVE MG/DL — SIGNIFICANT CHANGE UP
SP GR SPEC: 1.02 — SIGNIFICANT CHANGE UP (ref 1–1.03)
TSH SERPL-MCNC: 1.21 UIU/ML — SIGNIFICANT CHANGE UP (ref 0.36–3.74)
UROBILINOGEN FLD QL: 1 MG/DL — SIGNIFICANT CHANGE UP (ref 0.2–1)
VIT B12 SERPL-MCNC: 298 PG/ML — SIGNIFICANT CHANGE UP (ref 232–1245)

## 2024-11-16 PROCEDURE — 99232 SBSQ HOSP IP/OBS MODERATE 35: CPT

## 2024-11-16 RX ORDER — GLUCOSAMINE SULFATE DIPOT CHLR 500 MG
1 CAPSULE ORAL DAILY
Refills: 0 | Status: DISCONTINUED | OUTPATIENT
Start: 2024-11-16 | End: 2024-11-18

## 2024-11-16 RX ADMIN — AMLODIPINE BESYLATE 5 MILLIGRAM(S): 10 TABLET ORAL at 06:11

## 2024-11-16 RX ADMIN — Medication 1 MILLIGRAM(S): at 19:31

## 2024-11-16 RX ADMIN — ENOXAPARIN SODIUM 40 MILLIGRAM(S): 30 INJECTION SUBCUTANEOUS at 17:40

## 2024-11-16 RX ADMIN — Medication 1000 UNIT(S): at 13:51

## 2024-11-16 RX ADMIN — Medication 80 MILLIGRAM(S): at 21:37

## 2024-11-16 RX ADMIN — ENOXAPARIN SODIUM 40 MILLIGRAM(S): 30 INJECTION SUBCUTANEOUS at 06:10

## 2024-11-16 RX ADMIN — Medication 1000 MICROGRAM(S): at 13:51

## 2024-11-16 NOTE — PHYSICAL THERAPY INITIAL EVALUATION ADULT - DIAGNOSIS, PT EVAL
Pt presents with ability to perform tasks -bed mobility, transfers and ambulation with supervision to independent.

## 2024-11-16 NOTE — PHYSICAL THERAPY INITIAL EVALUATION ADULT - GAIT TRAINING, PT EVAL
Pt will be able to ambulate with or without assistive device up to 200 ft or more, be able to negotiate steps safely observing proper gait, posture and prevent falls and eventually be a safe community ambulator

## 2024-11-16 NOTE — PHYSICAL THERAPY INITIAL EVALUATION ADULT - WEIGHT-BEARING RESTRICTIONS, REHAB EVAL
Patient should transition to regular activity level. Resume regular diet. Patient should follow up with her OB for a postpartum checkup 4-6 weeks after delivery. Patient should call her doctor sooner if she develops a fever or uncontrolled vaginal bleeding. Please call sooner if there are any other concerns. Asymptomatic full weight-bearing

## 2024-11-16 NOTE — PROGRESS NOTE ADULT - ASSESSMENT
83 year old male with PMHx of HTN, HLD, NIDDM2, CAD, hx of prostate CA (s/p s/p prostatectomy in 1998 and XRT), NPH (refused intervention in the past), and ?memory loss who presented to the ED on 11/14/24 for complaints of tremors and admitted for physical deconditioning.    #Tremors  -relates to deconditioning vss NPH?  -CTH: :No acute intracranial hemorrhage, mass effect, or midline shift.  Ventriculomegaly, patient with known NPH as above.  -Appreciate neurology recs  -f/u PT recs      #Cloudy and foul smelling urine, sxs?  -check UA      Chronic medical conditions:  HTN: PTA amlodipine 5 mg  HLD: PTA atorvastatin 80 mg  NIDDM2: last known A1c 6.8 (on 1/27/22), POC qac and qhs, low dose SSI qac started, PTA sitagliptin 100 mg held, blood glucose goal 120-180, f/u A1c  CAD: not on any PTA meds  NPH: CT head with evidence of ventriculomegaly, not new diagnosis, previously refused intervention as per documentation from outside facility

## 2024-11-16 NOTE — PROGRESS NOTE ADULT - SUBJECTIVE AND OBJECTIVE BOX
PROGRESS NOTE:     Patient is a 83y old  Male who presents with a chief complaint of Physical deconditioning (15 Nov 2024 13:28)      SUBJECTIVE / OVERNIGHT EVENTS:No acute overnight events. patient states tremors are improving, but now complains of foul smelling and cloudy urine.         MEDICATIONS  (STANDING):  amLODIPine   Tablet 5 milliGRAM(s) Oral daily  atorvastatin 80 milliGRAM(s) Oral at bedtime  cholecalciferol 1000 Unit(s) Oral daily  cyanocobalamin 1000 MICROGram(s) Oral daily  dextrose 5%. 1000 milliLiter(s) (100 mL/Hr) IV Continuous <Continuous>  dextrose 5%. 1000 milliLiter(s) (50 mL/Hr) IV Continuous <Continuous>  dextrose 50% Injectable 25 Gram(s) IV Push once  dextrose 50% Injectable 12.5 Gram(s) IV Push once  dextrose 50% Injectable 25 Gram(s) IV Push once  enoxaparin Injectable 40 milliGRAM(s) SubCutaneous every 12 hours  glucagon  Injectable 1 milliGRAM(s) IntraMuscular once  insulin lispro (ADMELOG) corrective regimen sliding scale   SubCutaneous three times a day before meals    MEDICATIONS  (PRN):  acetaminophen     Tablet .. 650 milliGRAM(s) Oral every 6 hours PRN Temp greater or equal to 38C (100.4F), Mild Pain (1 - 3)  dextrose Oral Gel 15 Gram(s) Oral once PRN Blood Glucose LESS THAN 70 milliGRAM(s)/deciliter      CAPILLARY BLOOD GLUCOSE      POCT Blood Glucose.: 137 mg/dL (2024 11:53)  POCT Blood Glucose.: 109 mg/dL (2024 07:42)  POCT Blood Glucose.: 134 mg/dL (15 Nov 2024 21:31)  POCT Blood Glucose.: 117 mg/dL (15 Nov 2024 16:11)    I&O's Summary    15 Nov 2024 07:01  -  2024 07:00  --------------------------------------------------------  IN: 650 mL / OUT: 925 mL / NET: -275 mL        PHYSICAL EXAM:  Vital Signs Last 24 Hrs  T(C): 36.6 (2024 11:45), Max: 36.6 (15 Nov 2024 17:12)  T(F): 97.8 (2024 11:45), Max: 97.9 (15 Nov 2024 17:12)  HR: 66 (2024 11:45) (66 - 73)  BP: 127/78 (2024 11:45) (121/75 - 137/78)  BP(mean): --  RR: 18 (2024 11:45) (18 - 18)  SpO2: 97% (2024 11:45) (94% - 99%)    Parameters below as of 2024 11:45  Patient On (Oxygen Delivery Method): room air        GENERAL: stable, comfortable on RA, no fevers or SOB, baseline dementia (A&Ox3 today, but forgetful at times  NECK: supple, No JVD  CHEST/LUNG: clear to auscultation bilaterally; no rales, rhonchi, or wheezing b/l  HEART: normal S1, S2  ABDOMEN: BS+, soft, ND, NT   EXTREMITIES:  pulses palpable; no clubbing, cyanosis, or edema b/l LEs    LABS:                        15.8   11.36 )-----------( 278      ( 2024 16:20 )             46.1     11-14    141  |  109[H]  |  8   ----------------------------<  103[H]  3.7   |  26  |  1.16    Ca    9.6      2024 16:20    TPro  7.7  /  Alb  3.9  /  TBili  1.2  /  DBili  x   /  AST  11[L]  /  ALT  16  /  AlkPhos  113  11-14          Urinalysis Basic - ( 2024 20:07 )    Color: Yellow / Appearance: Clear / S.016 / pH: x  Gluc: x / Ketone: Negative mg/dL  / Bili: Negative / Urobili: 1.0 mg/dL   Blood: x / Protein: 30 mg/dL / Nitrite: Negative   Leuk Esterase: Negative / RBC: 3 /HPF / WBC 3 /HPF   Sq Epi: x / Non Sq Epi: x / Bacteria: Moderate /HPF        Urinalysis with Rflx Culture (collected 2024 20:07)        RADIOLOGY & ADDITIONAL TESTS:  Results Reviewed:   Imaging Personally Reviewed:  Electrocardiogram Personally Reviewed:    COORDINATION OF CARE:  Care Discussed with Consultants/Other Providers [Y/N]:  Prior or Outpatient Records Reviewed [Y/N]:

## 2024-11-16 NOTE — PHYSICAL THERAPY INITIAL EVALUATION ADULT - BALANCE TRAINING, PT EVAL
Independent sitting, transfers, standing and ambulation with good balance with or without assistive device and prevent falls.

## 2024-11-16 NOTE — PHYSICAL THERAPY INITIAL EVALUATION ADULT - LIVES WITH, PROFILE
Pt states he lives with wife in pvt house, has 7 steps with rails to enter the house and 1 flight to go to the bedroom on the 2nd floor.

## 2024-11-16 NOTE — PHYSICAL THERAPY INITIAL EVALUATION ADULT - ADDITIONAL COMMENTS
Pt. states prior to this admission pt is independent in ADLs and ambulates with no assistive device.

## 2024-11-17 LAB
ANION GAP SERPL CALC-SCNC: 7 MMOL/L — SIGNIFICANT CHANGE UP (ref 5–17)
BUN SERPL-MCNC: 15 MG/DL — SIGNIFICANT CHANGE UP (ref 7–23)
CALCIUM SERPL-MCNC: 8.6 MG/DL — SIGNIFICANT CHANGE UP (ref 8.5–10.1)
CHLORIDE SERPL-SCNC: 110 MMOL/L — HIGH (ref 96–108)
CO2 SERPL-SCNC: 25 MMOL/L — SIGNIFICANT CHANGE UP (ref 22–31)
CREAT SERPL-MCNC: 1.13 MG/DL — SIGNIFICANT CHANGE UP (ref 0.5–1.3)
EGFR: 64 ML/MIN/1.73M2 — SIGNIFICANT CHANGE UP
GLUCOSE BLDC GLUCOMTR-MCNC: 114 MG/DL — HIGH (ref 70–99)
GLUCOSE BLDC GLUCOMTR-MCNC: 121 MG/DL — HIGH (ref 70–99)
GLUCOSE BLDC GLUCOMTR-MCNC: 148 MG/DL — HIGH (ref 70–99)
GLUCOSE BLDC GLUCOMTR-MCNC: 95 MG/DL — SIGNIFICANT CHANGE UP (ref 70–99)
GLUCOSE SERPL-MCNC: 110 MG/DL — HIGH (ref 70–99)
HCT VFR BLD CALC: 33.5 % — LOW (ref 39–50)
HGB BLD-MCNC: 11.3 G/DL — LOW (ref 13–17)
MCHC RBC-ENTMCNC: 30.6 PG — SIGNIFICANT CHANGE UP (ref 27–34)
MCHC RBC-ENTMCNC: 33.7 G/DL — SIGNIFICANT CHANGE UP (ref 32–36)
MCV RBC AUTO: 90.8 FL — SIGNIFICANT CHANGE UP (ref 80–100)
NRBC # BLD: 0 /100 WBCS — SIGNIFICANT CHANGE UP (ref 0–0)
PLATELET # BLD AUTO: 211 K/UL — SIGNIFICANT CHANGE UP (ref 150–400)
POTASSIUM SERPL-MCNC: 3.6 MMOL/L — SIGNIFICANT CHANGE UP (ref 3.5–5.3)
POTASSIUM SERPL-SCNC: 3.6 MMOL/L — SIGNIFICANT CHANGE UP (ref 3.5–5.3)
RBC # BLD: 3.69 M/UL — LOW (ref 4.2–5.8)
RBC # FLD: 12.2 % — SIGNIFICANT CHANGE UP (ref 10.3–14.5)
SODIUM SERPL-SCNC: 142 MMOL/L — SIGNIFICANT CHANGE UP (ref 135–145)
WBC # BLD: 7.33 K/UL — SIGNIFICANT CHANGE UP (ref 3.8–10.5)
WBC # FLD AUTO: 7.33 K/UL — SIGNIFICANT CHANGE UP (ref 3.8–10.5)

## 2024-11-17 PROCEDURE — 99239 HOSP IP/OBS DSCHRG MGMT >30: CPT

## 2024-11-17 RX ADMIN — Medication 1000 MICROGRAM(S): at 12:16

## 2024-11-17 RX ADMIN — AMLODIPINE BESYLATE 5 MILLIGRAM(S): 10 TABLET ORAL at 05:11

## 2024-11-17 RX ADMIN — Medication 1 MILLIGRAM(S): at 12:16

## 2024-11-17 RX ADMIN — Medication 80 MILLIGRAM(S): at 21:48

## 2024-11-17 RX ADMIN — Medication 1000 UNIT(S): at 12:16

## 2024-11-17 RX ADMIN — ENOXAPARIN SODIUM 40 MILLIGRAM(S): 30 INJECTION SUBCUTANEOUS at 19:00

## 2024-11-17 RX ADMIN — ENOXAPARIN SODIUM 40 MILLIGRAM(S): 30 INJECTION SUBCUTANEOUS at 05:11

## 2024-11-17 NOTE — PROGRESS NOTE ADULT - SUBJECTIVE AND OBJECTIVE BOX
PROGRESS NOTE:     Patient is a 83y old  Male who presents with a chief complaint of Physical deconditioning (16 Nov 2024 13:00)      SUBJECTIVE / OVERNIGHT EVENTS:No acute overnight events. Patients's tremors have resolved.          MEDICATIONS  (STANDING):  amLODIPine   Tablet 5 milliGRAM(s) Oral daily  atorvastatin 80 milliGRAM(s) Oral at bedtime  cholecalciferol 1000 Unit(s) Oral daily  cyanocobalamin 1000 MICROGram(s) Oral daily  dextrose 5%. 1000 milliLiter(s) (100 mL/Hr) IV Continuous <Continuous>  dextrose 5%. 1000 milliLiter(s) (50 mL/Hr) IV Continuous <Continuous>  dextrose 50% Injectable 25 Gram(s) IV Push once  dextrose 50% Injectable 12.5 Gram(s) IV Push once  dextrose 50% Injectable 25 Gram(s) IV Push once  enoxaparin Injectable 40 milliGRAM(s) SubCutaneous every 12 hours  folic acid 1 milliGRAM(s) Oral daily  glucagon  Injectable 1 milliGRAM(s) IntraMuscular once  insulin lispro (ADMELOG) corrective regimen sliding scale   SubCutaneous three times a day before meals    MEDICATIONS  (PRN):  acetaminophen     Tablet .. 650 milliGRAM(s) Oral every 6 hours PRN Temp greater or equal to 38C (100.4F), Mild Pain (1 - 3)  dextrose Oral Gel 15 Gram(s) Oral once PRN Blood Glucose LESS THAN 70 milliGRAM(s)/deciliter      CAPILLARY BLOOD GLUCOSE      POCT Blood Glucose.: 148 mg/dL (17 Nov 2024 11:27)  POCT Blood Glucose.: 114 mg/dL (17 Nov 2024 08:15)  POCT Blood Glucose.: 102 mg/dL (16 Nov 2024 21:03)  POCT Blood Glucose.: 110 mg/dL (16 Nov 2024 16:36)    I&O's Summary    16 Nov 2024 07:01  -  17 Nov 2024 07:00  --------------------------------------------------------  IN: 250 mL / OUT: 1400 mL / NET: -1150 mL        PHYSICAL EXAM:  Vital Signs Last 24 Hrs  T(C): 36.8 (17 Nov 2024 11:37), Max: 37.1 (16 Nov 2024 16:39)  T(F): 98.2 (17 Nov 2024 11:37), Max: 98.7 (16 Nov 2024 16:39)  HR: 70 (17 Nov 2024 11:37) (63 - 70)  BP: 148/92 (17 Nov 2024 11:37) (118/73 - 148/92)  BP(mean): --  RR: 18 (17 Nov 2024 11:37) (17 - 18)  SpO2: 98% (17 Nov 2024 11:37) (94% - 98%)    Parameters below as of 17 Nov 2024 11:37  Patient On (Oxygen Delivery Method): room air        GENERAL: stable, comfortable on RA, no fevers or SOB, baseline dementia (A&Ox3 today, but forgetful at times  NECK: supple, No JVD  CHEST/LUNG: clear to auscultation bilaterally; no rales, rhonchi, or wheezing b/l  HEART: normal S1, S2  ABDOMEN: BS+, soft, ND, NT   EXTREMITIES:  pulses palpable; no clubbing, cyanosis, or edema b/l LEs    LABS:                        11.3   7.33  )-----------( 211      ( 17 Nov 2024 06:27 )             33.5     11-17    142  |  110[H]  |  15  ----------------------------<  110[H]  3.6   |  25  |  1.13    Ca    8.6      17 Nov 2024 06:27            Urinalysis Basic - ( 17 Nov 2024 06:27 )    Color: x / Appearance: x / SG: x / pH: x  Gluc: 110 mg/dL / Ketone: x  / Bili: x / Urobili: x   Blood: x / Protein: x / Nitrite: x   Leuk Esterase: x / RBC: x / WBC x   Sq Epi: x / Non Sq Epi: x / Bacteria: x        Urinalysis with Rflx Culture (collected 14 Nov 2024 20:07)        RADIOLOGY & ADDITIONAL TESTS:  Results Reviewed:   Imaging Personally Reviewed:  Electrocardiogram Personally Reviewed:    COORDINATION OF CARE:  Care Discussed with Consultants/Other Providers [Y/N]:  Prior or Outpatient Records Reviewed [Y/N]:

## 2024-11-17 NOTE — DISCHARGE NOTE PROVIDER - NSDCCPCAREPLAN_GEN_ALL_CORE_FT
PRINCIPAL DISCHARGE DIAGNOSIS  Diagnosis: Tremor  Assessment and Plan of Treatment: You came to the hospital with tremors which have since resolved. You had a CT of your head which showed results consistent with your known NPH. Your tremors resolved. You were seen by PT who recommended that you walk with a cane. Please follow up with neurology after discharge.     PRINCIPAL DISCHARGE DIAGNOSIS  Diagnosis: Tremor  Assessment and Plan of Treatment: You came to the hospital with tremors which have since resolved. You had a CT of your head which showed results consistent with your known NPH. Your tremors resolved. You were seen by PT who recommended that you walk with a cane. Please follow up with neurology after discharge.      SECONDARY DISCHARGE DIAGNOSES  Diagnosis: Physical deconditioning  Assessment and Plan of Treatment:     Diagnosis: Diabetes mellitus type II, non insulin dependent  Assessment and Plan of Treatment:     Diagnosis: HLD (hyperlipidemia)  Assessment and Plan of Treatment:     Diagnosis: HTN (hypertension)  Assessment and Plan of Treatment:

## 2024-11-17 NOTE — DISCHARGE NOTE PROVIDER - PROVIDER TOKENS
PROVIDER:[TOKEN:[[id number]]] PROVIDER:[TOKEN:[[id number]]],FREE:[LAST:[Your primary care provider],PHONE:[(   )    -],FAX:[(   )    -],FOLLOWUP:[1 week]]

## 2024-11-17 NOTE — DISCHARGE NOTE PROVIDER - HOSPITAL COURSE
83 year old male with PMHx of HTN, HLD, NIDDM2, CAD, hx of prostate CA (s/p s/p prostatectomy in 1998 and XRT), NPH (refused intervention in the past), and ?memory loss who presented to the ED on 11/14/24 for complaints of tremors and admitted for physical deconditioning.   -relates to deconditioning vss NPH?  -CTH: :No acute intracranial hemorrhage, mass effect, or midline shift.  Ventriculomegaly, patient with known NPH as above. Seen by neurology who recommended  outpatient f/u.   Tremors resolved  -PT saw the patient, recs home with cane  -F/u neurology after discharge.  83 year old male with PMHx of HTN, HLD, NIDDM2, CAD, hx of prostate CA (s/p s/p prostatectomy in 1998 and XRT), NPH (refused intervention in the past), and ?memory loss who presented to the ED on 11/14/24 for complaints of tremors and admitted for physical deconditioning.     -relates to deconditioning vss NPH?  -CTH: :No acute intracranial hemorrhage, mass effect, or midline shift.  Ventriculomegaly, patient with known NPH as above. Seen by neurology who recommended  outpatient f/u.   Tremors resolved  -PT saw the patient, recs home with cane  -F/u neurology after discharge.     Pt states he takes no meds at home. Will discharge home today.     See PMD one week. 83 year old male with PMHx of HTN, HLD, NIDDM2, CAD, hx of prostate CA (s/p s/p prostatectomy in 1998 and XRT), NPH (refused intervention in the past), and ?memory loss who presented to the ED on 11/14/24 for complaints of tremors and admitted for physical deconditioning.     -Relates to deconditioning vss NPH?  -CTH: No acute intracranial hemorrhage, mass effect, or midline shift.  - Ventriculomegaly, patient with known NPH as above. Seen by neurology who recommended outpatient f/u.   -Tremors resolved  -PT saw the patient, recs home with cane  -F/u neurology after discharge.     Pt states he takes no meds at home. Will discharge home today.     See PMD one week.

## 2024-11-17 NOTE — PROGRESS NOTE ADULT - ASSESSMENT
83 year old male with PMHx of HTN, HLD, NIDDM2, CAD, hx of prostate CA (s/p s/p prostatectomy in 1998 and XRT), NPH (refused intervention in the past), and ?memory loss who presented to the ED on 11/14/24 for complaints of tremors and admitted for physical deconditioning.     #Tremors  -resolved  -relates to deconditioning vss NPH?  -CTH: :No acute intracranial hemorrhage, mass effect, or midline shift.  Ventriculomegaly, patient with known NPH as above.  -Appreciate neurology recs  -PT recs home with cane  -F/u neurology after discharge.     Chronic medical conditions:  HTN: PTA amlodipine 5 mg  HLD: PTA atorvastatin 80 mg  NIDDM2: last known A1c 6.8 (on 1/27/22), POC qac and qhs, low dose SSI qac started, PTA sitagliptin 100 mg held, blood glucose goal 120-180, f/u A1c  CAD: not on any PTA meds  NPH: CT head with evidence of ventriculomegaly, not new diagnosis, previously refused intervention as per documentation from outside facility    Discharge home today when can get in contact with wife to bring him home. spent >35 minutes

## 2024-11-17 NOTE — PATIENT PROFILE ADULT - FALL HARM RISK - HARM RISK INTERVENTIONS

## 2024-11-17 NOTE — DISCHARGE NOTE PROVIDER - NSDCMRMEDTOKEN_GEN_ALL_CORE_FT
amLODIPine 5 mg oral tablet: 1 tab(s) orally once a day  atorvastatin 80 mg oral tablet: 1 tab(s) orally once a day (at bedtime)  cholecalciferol 25 mcg (1000 intl units) oral tablet: 1 tab(s) orally once a day  cyanocobalamin 250 mcg oral tablet: 1 tab(s) orally once a day  Januvia 100 mg oral tablet: 1 tab(s) orally once a day

## 2024-11-17 NOTE — DISCHARGE NOTE PROVIDER - CARE PROVIDER_API CALL
Georgi Ellison)  Neurology  [address] [facility] [address]  [place] [address]  Phone: [phone]  Fax: [phone]  Follow Up Time:    Georgi Ellison)  Neurology  [address] [facility] [address], [address]  [place] [address]  Phone: [phone]  Fax: [phone]  Follow Up Time:     Your primary care provider,   Phone: (   )    -  Fax: (   )    -  Follow Up Time: 1 week

## 2024-11-18 ENCOUNTER — TRANSCRIPTION ENCOUNTER (OUTPATIENT)
Age: 84
End: 2024-11-18

## 2024-11-18 VITALS — HEART RATE: 67 BPM | OXYGEN SATURATION: 96 % | DIASTOLIC BLOOD PRESSURE: 80 MMHG | SYSTOLIC BLOOD PRESSURE: 148 MMHG

## 2024-11-18 LAB
GLUCOSE BLDC GLUCOMTR-MCNC: 112 MG/DL — HIGH (ref 70–99)
GLUCOSE BLDC GLUCOMTR-MCNC: 163 MG/DL — HIGH (ref 70–99)
GLUCOSE BLDC GLUCOMTR-MCNC: 96 MG/DL — SIGNIFICANT CHANGE UP (ref 70–99)

## 2024-11-18 PROCEDURE — 99239 HOSP IP/OBS DSCHRG MGMT >30: CPT

## 2024-11-18 RX ADMIN — Medication 1000 MICROGRAM(S): at 12:20

## 2024-11-18 RX ADMIN — Medication 1: at 12:17

## 2024-11-18 RX ADMIN — AMLODIPINE BESYLATE 5 MILLIGRAM(S): 10 TABLET ORAL at 05:52

## 2024-11-18 RX ADMIN — Medication 1 MILLIGRAM(S): at 12:18

## 2024-11-18 RX ADMIN — ENOXAPARIN SODIUM 40 MILLIGRAM(S): 30 INJECTION SUBCUTANEOUS at 05:52

## 2024-11-18 RX ADMIN — Medication 1000 UNIT(S): at 12:20

## 2024-11-18 NOTE — PROGRESS NOTE ADULT - ASSESSMENT
Royal Jacob is an 83 year old male with PMHx of HTN, HLD, NIDDM2, CAD, hx of prostate CA (s/p s/p prostatectomy in 1998 and XRT), NPH (refused intervention in the past), and ?memory loss who presented to the ED on 11/14/24 for complaints of tremors and admitted for physical deconditioning.      Physical deconditioning : Worried he will fall since he is shaking  Baseline functional status is ambulates unassisted indoors and with walker outdoors  Fall precautions  PT consulted, pending eval.     Per neuro note will check B-12, folate and TSH in AM.       Chronic medical conditions:  HTN: PTA amlodipine 5 mg  HLD: PTA atorvastatin 80 mg  NIDDM2: last known A1c 6.8 (on 1/27/22), POC qac and qhs, low dose SSI qac started, PTA sitagliptin 100 mg held, blood glucose goal < 180, f/u A1c  CAD: not on any PTA meds  NPH: CT head with evidence of ventriculomegaly, not new diagnosis, previously refused intervention as per documentation from outside facility    Check PT eval and dispo per PT assesment.     Needs B-12, TSH and folate in AM.  Royal Jacob is an 83 year old male with PMHx of HTN, HLD, NIDDM2, CAD, hx of prostate CA (s/p s/p prostatectomy in 1998 and XRT), NPH (refused intervention in the past), and ?memory loss who presented to the ED on 11/14/24 for complaints of tremors and admitted for physical deconditioning.      Physical deconditioning : Worried he will fall since he is shaking  Baseline functional status is ambulates unassisted indoors and with walker outdoors  Fall precautions  PT consulted, pending eval.     Per neuro note will check B-12, folate and TSH in AM.       Chronic medical conditions:  HTN: PTA amlodipine 5 mg  HLD: PTA atorvastatin 80 mg  NIDDM2: last known A1c 6.8 (on 1/27/22), POC qac and qhs, low dose SSI qac started, PTA sitagliptin 100 mg held, blood glucose goal < 180, f/u A1c  CAD: not on any PTA meds  NPH: CT head with evidence of ventriculomegaly, not new diagnosis, previously refused intervention as per documentation from outside facility    Per PT note, discharge home with home PT.       Needs B-12, TSH and folate in AM.  Royal Jacob is an 83 year old male with PMHx of HTN, HLD, NIDDM2, CAD, hx of prostate CA (s/p s/p prostatectomy in 1998 and XRT), NPH (refused intervention in the past), and ?memory loss who presented to the ED on 11/14/24 for complaints of tremors and admitted for physical deconditioning.      Physical deconditioning : Worried he will fall since he is shaking  Baseline functional status is ambulates unassisted indoors and with walker outdoors  Fall precautions  PT consulted, pending eval.     Per neuro note will check B-12, folate and TSH in AM.       Chronic medical conditions:  HTN: PTA amlodipine 5 mg  HLD: PTA atorvastatin 80 mg  NIDDM2: last known A1c 6.8 (on 1/27/22), POC qac and qhs, low dose SSI qac started, PTA sitagliptin 100 mg held, blood glucose goal < 180, f/u A1c  CAD: not on any PTA meds  NPH: CT head with evidence of ventriculomegaly, not new diagnosis, previously refused intervention as per documentation from outside facility    Per PT note, discharge home with home PT.       Can be discharged if girlfriend willing to pick pt up and take home.

## 2024-11-18 NOTE — DISCHARGE NOTE NURSING/CASE MANAGEMENT/SOCIAL WORK - NSDCPEFALRISK_GEN_ALL_CORE
For information on Fall & Injury Prevention, visit: [email or web address] OR  [email or web address] OR  https://www.cdc.gov/steadi/patient.html

## 2024-11-18 NOTE — PROGRESS NOTE ADULT - SUBJECTIVE AND OBJECTIVE BOX
INTERVAL HPI/OVERNIGHT EVENTS:  Pt seen and examined at bedside.     Allergies/Intolerance: No Known Allergies      MEDICATIONS  (STANDING):  amLODIPine   Tablet 5 milliGRAM(s) Oral daily  atorvastatin 80 milliGRAM(s) Oral at bedtime  cholecalciferol 1000 Unit(s) Oral daily  cyanocobalamin 1000 MICROGram(s) Oral daily  dextrose 5%. 1000 milliLiter(s) (100 mL/Hr) IV Continuous <Continuous>  dextrose 5%. 1000 milliLiter(s) (50 mL/Hr) IV Continuous <Continuous>  dextrose 50% Injectable 25 Gram(s) IV Push once  dextrose 50% Injectable 12.5 Gram(s) IV Push once  dextrose 50% Injectable 25 Gram(s) IV Push once  enoxaparin Injectable 40 milliGRAM(s) SubCutaneous every 12 hours  folic acid 1 milliGRAM(s) Oral daily  glucagon  Injectable 1 milliGRAM(s) IntraMuscular once  insulin lispro (ADMELOG) corrective regimen sliding scale   SubCutaneous three times a day before meals    MEDICATIONS  (PRN):  acetaminophen     Tablet .. 650 milliGRAM(s) Oral every 6 hours PRN Temp greater or equal to 38C (100.4F), Mild Pain (1 - 3)  dextrose Oral Gel 15 Gram(s) Oral once PRN Blood Glucose LESS THAN 70 milliGRAM(s)/deciliter        ROS: all systems reviewed and wnl      PHYSICAL EXAMINATION:  Vital Signs Last 24 Hrs  T(C): 36.5 (18 Nov 2024 05:00), Max: 37.1 (17 Nov 2024 23:00)  T(F): 97.7 (18 Nov 2024 05:00), Max: 98.7 (17 Nov 2024 23:00)  HR: 60 (18 Nov 2024 05:00) (59 - 70)  BP: 131/75 (18 Nov 2024 05:00) (129/76 - 148/92)  BP(mean): --  RR: 18 (18 Nov 2024 05:00) (18 - 18)  SpO2: 97% (18 Nov 2024 05:00) (97% - 98%)    Parameters below as of 18 Nov 2024 05:00  Patient On (Oxygen Delivery Method): room air      CAPILLARY BLOOD GLUCOSE      POCT Blood Glucose.: 96 mg/dL (18 Nov 2024 07:44)  POCT Blood Glucose.: 121 mg/dL (17 Nov 2024 21:11)  POCT Blood Glucose.: 95 mg/dL (17 Nov 2024 16:19)  POCT Blood Glucose.: 148 mg/dL (17 Nov 2024 11:27)      11-17 @ 07:01  -  11-18 @ 07:00  --------------------------------------------------------  IN: 940 mL / OUT: 1900 mL / NET: -960 mL        GENERAL:   NECK: supple, No JVD  CHEST/LUNG: clear to auscultation bilaterally; no rales, rhonchi, or wheezing b/l  HEART: normal S1, S2  ABDOMEN: BS+, soft, ND, NT   EXTREMITIES:  pulses palpable; no clubbing, cyanosis, or edema b/l LEs      LABS:                        11.3   7.33  )-----------( 211      ( 17 Nov 2024 06:27 )             33.5     11-17    142  |  110[H]  |  15  ----------------------------<  110[H]  3.6   |  25  |  1.13    Ca    8.6      17 Nov 2024 06:27        Urinalysis Basic - ( 17 Nov 2024 06:27 )    Color: x / Appearance: x / SG: x / pH: x  Gluc: 110 mg/dL / Ketone: x  / Bili: x / Urobili: x   Blood: x / Protein: x / Nitrite: x   Leuk Esterase: x / RBC: x / WBC x   Sq Epi: x / Non Sq Epi: x / Bacteria: x

## 2024-11-18 NOTE — PROGRESS NOTE ADULT - ASSESSMENT
83 year old male with PMHx of HTN, HLD, NIDDM2, CAD, hx of prostate CA (s/p prostatectomy in 1998 and XRT), NPH (refused intervention in the past), and ?memory loss who presented to the ED on 11/14/24 for complaints of tremors/ tremors resolved. PE cognitive defiicts  CTH  ventriculomegaly, DESH to my eyes, refused intervention for NPH in th past    Impression: Dementia, NPH      -supportive care  -PT  -No further inpatient Neurologic workup at this time  -Can follow up with Neurology, Dr. Georgi Ellison at [phone] for further w/u  -B12, folate, TSH   -In order to enhance patient's overall well-being and clinical course, please try avoiding benzodiazepines, anticholinergics, and antihistamines (Can cause worsening confusion/delirium). Additionally, continue reorientation, supportive care, maintaining regular sleep/wake cycle, and optimizing nutritional/medical factors.

## 2024-11-18 NOTE — DISCHARGE NOTE NURSING/CASE MANAGEMENT/SOCIAL WORK - PATIENT PORTAL LINK FT
You can access the FollowMyHealth Patient Portal offered by [facility] by registering at the following website: [email or web address]. By joining [facility]’s FollowMyHealth portal, you will also be able to view your health information using other applications (apps) compatible with our system.

## 2024-11-18 NOTE — PROGRESS NOTE ADULT - SUBJECTIVE AND OBJECTIVE BOX
Patient seen and examined this am. No new events    MEDICATIONS:    acetaminophen     Tablet .. 650 milliGRAM(s) Oral every 6 hours PRN  amLODIPine   Tablet 5 milliGRAM(s) Oral daily  atorvastatin 80 milliGRAM(s) Oral at bedtime  cholecalciferol 1000 Unit(s) Oral daily  cyanocobalamin 1000 MICROGram(s) Oral daily  dextrose 5%. 1000 milliLiter(s) IV Continuous <Continuous>  dextrose 5%. 1000 milliLiter(s) IV Continuous <Continuous>  dextrose 50% Injectable 25 Gram(s) IV Push once  dextrose 50% Injectable 12.5 Gram(s) IV Push once  dextrose 50% Injectable 25 Gram(s) IV Push once  dextrose Oral Gel 15 Gram(s) Oral once PRN  enoxaparin Injectable 40 milliGRAM(s) SubCutaneous every 12 hours  folic acid 1 milliGRAM(s) Oral daily  glucagon  Injectable 1 milliGRAM(s) IntraMuscular once  insulin lispro (ADMELOG) corrective regimen sliding scale   SubCutaneous three times a day before meals      LABS:                          11.3   7.33  )-----------( 211      ( 17 Nov 2024 06:27 )             33.5     11-17    142  |  110[H]  |  15  ----------------------------<  110[H]  3.6   |  25  |  1.13    Ca    8.6      17 Nov 2024 06:27      CAPILLARY BLOOD GLUCOSE      POCT Blood Glucose.: 163 mg/dL (18 Nov 2024 11:21)  POCT Blood Glucose.: 96 mg/dL (18 Nov 2024 07:44)  POCT Blood Glucose.: 121 mg/dL (17 Nov 2024 21:11)  POCT Blood Glucose.: 95 mg/dL (17 Nov 2024 16:19)      Urinalysis Basic - ( 17 Nov 2024 06:27 )    Color: x / Appearance: x / SG: x / pH: x  Gluc: 110 mg/dL / Ketone: x  / Bili: x / Urobili: x   Blood: x / Protein: x / Nitrite: x   Leuk Esterase: x / RBC: x / WBC x   Sq Epi: x / Non Sq Epi: x / Bacteria: x      I&O's Summary    17 Nov 2024 07:01  -  18 Nov 2024 07:00  --------------------------------------------------------  IN: 940 mL / OUT: 1900 mL / NET: -960 mL    18 Nov 2024 07:01  -  18 Nov 2024 11:50  --------------------------------------------------------  IN: 0 mL / OUT: 100 mL / NET: -100 mL      Vital Signs Last 24 Hrs  T(C): 36.3 (18 Nov 2024 11:04), Max: 37.1 (17 Nov 2024 23:00)  T(F): 97.4 (18 Nov 2024 11:04), Max: 98.7 (17 Nov 2024 23:00)  HR: 64 (18 Nov 2024 11:04) (59 - 66)  BP: 121/63 (18 Nov 2024 11:04) (121/63 - 135/83)  BP(mean): --  RR: 16 (18 Nov 2024 11:04) (16 - 18)  SpO2: 96% (18 Nov 2024 11:04) (96% - 97%)    Parameters below as of 18 Nov 2024 11:04  Patient On (Oxygen Delivery Method): room air            On Neurological Examination:    Neuro: AAOx2 wromng, month, obeys commands, no dysarthria; calculations intact, fluent names, repeats     CN: PERRL, EOMI, VFF normal gaze preference, no facial palsy,     Motor: no drift in all extremeties    Sensory: Intact to LT and PP no extinction    Coordination: FTN intact b/l          RADIOLOGY  < from: CT Head No Cont (11.14.24 @ 15:36) >    IMPRESSION:  No acute intracranial hemorrhage, mass effect, or midline shift.    Ventriculomegaly findings may be due to central volume loss however   noncommunicating hydrocephalus cannot be excluded.. Correlate clinically   for normal pressure hydrocephalus.        --- End of Report ---    < end of copied text >

## 2024-11-18 NOTE — DISCHARGE NOTE NURSING/CASE MANAGEMENT/SOCIAL WORK - FINANCIAL ASSISTANCE
[facility] provides services at a reduced cost to those who are determined to be eligible through [facility]’s financial assistance program. Information regarding [facility]’s financial assistance program can be found by going to [email or web address] or by calling [phone].

## 2024-11-23 DIAGNOSIS — Z79.84 LONG TERM (CURRENT) USE OF ORAL HYPOGLYCEMIC DRUGS: ICD-10-CM

## 2024-11-23 DIAGNOSIS — Z90.79 ACQUIRED ABSENCE OF OTHER GENITAL ORGAN(S): ICD-10-CM

## 2024-11-23 DIAGNOSIS — G91.2 (IDIOPATHIC) NORMAL PRESSURE HYDROCEPHALUS: ICD-10-CM

## 2024-11-23 DIAGNOSIS — I25.10 ATHEROSCLEROTIC HEART DISEASE OF NATIVE CORONARY ARTERY WITHOUT ANGINA PECTORIS: ICD-10-CM

## 2024-11-23 DIAGNOSIS — R25.1 TREMOR, UNSPECIFIED: ICD-10-CM

## 2024-11-23 DIAGNOSIS — Z85.46 PERSONAL HISTORY OF MALIGNANT NEOPLASM OF PROSTATE: ICD-10-CM

## 2024-11-23 DIAGNOSIS — E11.9 TYPE 2 DIABETES MELLITUS WITHOUT COMPLICATIONS: ICD-10-CM

## 2024-11-23 DIAGNOSIS — I10 ESSENTIAL (PRIMARY) HYPERTENSION: ICD-10-CM

## 2024-11-23 DIAGNOSIS — Z92.3 PERSONAL HISTORY OF IRRADIATION: ICD-10-CM

## 2024-11-23 DIAGNOSIS — E78.5 HYPERLIPIDEMIA, UNSPECIFIED: ICD-10-CM
